# Patient Record
Sex: FEMALE | Race: BLACK OR AFRICAN AMERICAN | NOT HISPANIC OR LATINO | URBAN - METROPOLITAN AREA
[De-identification: names, ages, dates, MRNs, and addresses within clinical notes are randomized per-mention and may not be internally consistent; named-entity substitution may affect disease eponyms.]

---

## 2022-03-24 PROBLEM — D57.3 SICKLE CELL TRAIT: Status: ACTIVE | Noted: 2022-03-24

## 2022-03-24 PROBLEM — R01.1 HEART MURMUR: Status: ACTIVE | Noted: 2022-03-24

## 2022-03-24 PROBLEM — A60.00 GENITAL HERPES: Status: ACTIVE | Noted: 2022-03-24

## 2022-03-24 PROBLEM — M25.50 MULTIPLE JOINT PAIN: Status: ACTIVE | Noted: 2022-03-24

## 2023-05-10 PROBLEM — E66.01 SEVERE OBESITY (BMI 35.0-39.9) WITH COMORBIDITY: Status: ACTIVE | Noted: 2023-05-10

## 2023-05-10 PROBLEM — R63.5 WEIGHT GAIN: Status: ACTIVE | Noted: 2023-05-10

## 2023-07-19 ENCOUNTER — PATIENT MESSAGE (OUTPATIENT)
Dept: RESEARCH | Facility: HOSPITAL | Age: 38
End: 2023-07-19

## 2023-07-31 ENCOUNTER — PATIENT MESSAGE (OUTPATIENT)
Dept: RESEARCH | Facility: HOSPITAL | Age: 38
End: 2023-07-31

## 2023-12-29 PROBLEM — E78.5 HYPERLIPIDEMIA: Status: ACTIVE | Noted: 2023-12-29

## 2023-12-29 PROBLEM — E88.810 DYSMETABOLIC SYNDROME X: Status: ACTIVE | Noted: 2023-12-29

## 2024-05-09 ENCOUNTER — OFFICE VISIT (OUTPATIENT)
Dept: SURGERY | Facility: CLINIC | Age: 39
End: 2024-05-09
Payer: COMMERCIAL

## 2024-05-09 VITALS
TEMPERATURE: 98 F | WEIGHT: 199.31 LBS | HEART RATE: 75 BPM | BODY MASS INDEX: 30.3 KG/M2 | SYSTOLIC BLOOD PRESSURE: 135 MMHG | DIASTOLIC BLOOD PRESSURE: 93 MMHG

## 2024-05-09 DIAGNOSIS — R92.8 ABNORMAL MAMMOGRAM OF LEFT BREAST: Primary | ICD-10-CM

## 2024-05-09 PROCEDURE — 99203 OFFICE O/P NEW LOW 30 MIN: CPT | Mod: S$GLB,,, | Performed by: SURGERY

## 2024-05-09 PROCEDURE — 3080F DIAST BP >= 90 MM HG: CPT | Mod: CPTII,S$GLB,, | Performed by: SURGERY

## 2024-05-09 PROCEDURE — 99999 PR PBB SHADOW E&M-EST. PATIENT-LVL III: CPT | Mod: PBBFAC,,, | Performed by: SURGERY

## 2024-05-09 PROCEDURE — 1159F MED LIST DOCD IN RCRD: CPT | Mod: CPTII,S$GLB,, | Performed by: SURGERY

## 2024-05-09 PROCEDURE — 3044F HG A1C LEVEL LT 7.0%: CPT | Mod: CPTII,S$GLB,, | Performed by: SURGERY

## 2024-05-09 PROCEDURE — 3008F BODY MASS INDEX DOCD: CPT | Mod: CPTII,S$GLB,, | Performed by: SURGERY

## 2024-05-09 PROCEDURE — 3075F SYST BP GE 130 - 139MM HG: CPT | Mod: CPTII,S$GLB,, | Performed by: SURGERY

## 2024-05-09 NOTE — PROGRESS NOTES
Patient ID: Carley Carrington is a 38 y.o. female.    Chief Complaint: No chief complaint on file.      HPI:  38-year-old female who presents for evaluation of breast pain.  The breast pain has been present for 5 years.  It was mainly on the left side.  She states that 5 years ago she had a breast mammogram and ultrasound done at [facility] that showed an abnormality that biopsy was recommended for.  She then became pregnant with both her 3rd in her 4th called and never underwent any biopsy or follow-up.      She presents now to discuss this in the fact that she is ongoing breast pain.      The patient denies any changes of her breasts    Review of Systems   Constitutional:  Negative for appetite change, chills, fatigue, fever and unexpected weight change.   HENT:  Negative for hearing loss and rhinorrhea.    Eyes:  Negative for visual disturbance.   Respiratory:  Negative for apnea, cough, shortness of breath and wheezing.    Cardiovascular:  Negative for chest pain and palpitations.   Gastrointestinal:  Negative for abdominal distention, abdominal pain, blood in stool, constipation, diarrhea, nausea and vomiting.   Genitourinary:  Negative for dysuria, frequency and urgency.   Musculoskeletal:  Negative for arthralgias and neck pain.   Skin:  Negative for rash.        Breast pain   Neurological:  Negative for seizures, weakness, numbness and headaches.   Hematological:  Negative for adenopathy. Does not bruise/bleed easily.   Psychiatric/Behavioral:  Negative for hallucinations. The patient is not nervous/anxious.      Current Outpatient Medications   Medication Sig Dispense Refill    metFORMIN (GLUCOPHAGE-XR) 500 MG ER 24hr tablet Take 2 tablets (1,000 mg total) by mouth once daily. 180 tablet 1    semaglutide (OZEMPIC) 2 mg/dose (8 mg/3 mL) PnIj Inject 2 mg into the skin every 7 days. Dx e88.81 3 mL 5     No current facility-administered medications for this visit.       Review of patient's allergies  indicates:  No Known Allergies    Past Medical History:   Diagnosis Date    Benign tumor of heart        No past surgical history on file.    Social History     Socioeconomic History    Marital status:     Number of children: 4   Tobacco Use    Smoking status: Never    Smokeless tobacco: Never   Substance and Sexual Activity    Alcohol use: Never    Drug use: Not Currently    Sexual activity: Yes       Vitals:    05/09/24 1143   BP: (!) 135/93   Pulse: 75   Temp: 97.9 °F (36.6 °C)     Cycles began at age 14   Four pregnancies  Four children    all   Continuing to have regular cycles.      No family history of breast cancer    Physical Exam  Constitutional:       Appearance: She is well-developed.   HENT:      Head: Normocephalic.   Eyes:      Pupils: Pupils are equal, round, and reactive to light.   Neck:      Thyroid: No thyromegaly.      Vascular: No JVD.      Trachea: No tracheal deviation.   Cardiovascular:      Rate and Rhythm: Normal rate and regular rhythm.      Heart sounds: Normal heart sounds.   Pulmonary:      Breath sounds: Normal breath sounds. No wheezing.   Abdominal:      General: Bowel sounds are normal. There is no distension.      Palpations: Abdomen is soft. Abdomen is not rigid. There is no mass.      Tenderness: There is no abdominal tenderness. There is no guarding or rebound.   Musculoskeletal:         General: Normal range of motion.   Lymphadenopathy:      Cervical: No cervical adenopathy.   Skin:     General: Skin is warm and dry.      Findings: No erythema or rash.      Comments: Numerous tattoos.      Bilateral breast exam was performed.  The breasts are symmetrical.  There are no skin changes, masses, nipple discharges nor axillary adenopathy noted bilaterally.   Neurological:      Mental Status: She is oriented to person, place, and time.     Assessment & Plan:    Breast pain  Recommended nonsteroidal anti-inflammatories.  She could try Primrose oil or vitamin-E which  may help although there has no established evidence for this.      History of an abnormality in the left breast for which biopsy was recommended.      Bilateral diagnostic mammograms and left-sided ultrasound.      Appropriate recommendations and follow up pending mammogram and ultrasound    To obtain her imaging from the [facility] and bring it to her mammogram needed for comparison

## 2024-05-09 NOTE — PATIENT INSTRUCTIONS
Need to have a mammogram and an ultrasound of your breast to re-evaluate the left breast for any abnormalities.      If there are any abnormalities a biopsy would be recommended by the radiology department.      I would recommend that you obtain your images from the [facility] and bring him with you to your mammogram appointment as they will need these for comparison      Breast pain is best treated with nonsteroidal anti-inflammatories.  There is some anti abdominal evidence that you can try vitamin-E and evening Primrose oil which may or may not help    Our office phone numbers are  [phone] and

## 2024-07-26 ENCOUNTER — OFFICE VISIT (OUTPATIENT)
Dept: OPHTHALMOLOGY | Facility: CLINIC | Age: 39
End: 2024-07-26
Payer: COMMERCIAL

## 2024-07-26 DIAGNOSIS — H40.013 OPEN ANGLE WITH BORDERLINE FINDINGS AND LOW GLAUCOMA RISK IN BOTH EYES: Primary | ICD-10-CM

## 2024-07-26 PROCEDURE — 99999 PR PBB SHADOW E&M-EST. PATIENT-LVL II: CPT | Mod: PBBFAC,,, | Performed by: OPHTHALMOLOGY

## 2024-09-25 ENCOUNTER — OFFICE VISIT (OUTPATIENT)
Dept: OBSTETRICS AND GYNECOLOGY | Facility: CLINIC | Age: 39
End: 2024-09-25
Payer: COMMERCIAL

## 2024-09-25 ENCOUNTER — LAB VISIT (OUTPATIENT)
Dept: LAB | Facility: HOSPITAL | Age: 39
End: 2024-09-25
Attending: OBSTETRICS & GYNECOLOGY
Payer: COMMERCIAL

## 2024-09-25 VITALS
HEIGHT: 70 IN | SYSTOLIC BLOOD PRESSURE: 124 MMHG | WEIGHT: 171.75 LBS | BODY MASS INDEX: 24.59 KG/M2 | DIASTOLIC BLOOD PRESSURE: 86 MMHG

## 2024-09-25 DIAGNOSIS — Z11.3 SCREENING EXAMINATION FOR STD (SEXUALLY TRANSMITTED DISEASE): ICD-10-CM

## 2024-09-25 DIAGNOSIS — Z01.419 ENCOUNTER FOR GYNECOLOGICAL EXAMINATION (GENERAL) (ROUTINE) WITHOUT ABNORMAL FINDINGS: Primary | ICD-10-CM

## 2024-09-25 DIAGNOSIS — Z12.4 SCREENING FOR CERVICAL CANCER: ICD-10-CM

## 2024-09-25 DIAGNOSIS — Z72.51 HIGH RISK HETEROSEXUAL BEHAVIOR: ICD-10-CM

## 2024-09-25 DIAGNOSIS — N89.8 VAGINAL DISCHARGE: ICD-10-CM

## 2024-09-25 DIAGNOSIS — N92.1 METRORRHAGIA: ICD-10-CM

## 2024-09-25 DIAGNOSIS — Z30.431 ENCOUNTER FOR ROUTINE CHECKING OF INTRAUTERINE CONTRACEPTIVE DEVICE (IUD): ICD-10-CM

## 2024-09-25 LAB
HIV 1+2 AB+HIV1 P24 AG SERPL QL IA: NORMAL
TREPONEMA PALLIDUM IGG+IGM AB [PRESENCE] IN SERUM OR PLASMA BY IMMUNOASSAY: NONREACTIVE

## 2024-09-25 PROCEDURE — 99999 PR PBB SHADOW E&M-EST. PATIENT-LVL III: CPT | Mod: PBBFAC,,, | Performed by: OBSTETRICS & GYNECOLOGY

## 2024-09-25 PROCEDURE — 80074 ACUTE HEPATITIS PANEL: CPT | Performed by: OBSTETRICS & GYNECOLOGY

## 2024-09-25 PROCEDURE — 87491 CHLMYD TRACH DNA AMP PROBE: CPT | Performed by: OBSTETRICS & GYNECOLOGY

## 2024-09-25 PROCEDURE — 87591 N.GONORRHOEAE DNA AMP PROB: CPT | Performed by: OBSTETRICS & GYNECOLOGY

## 2024-09-25 PROCEDURE — 86593 SYPHILIS TEST NON-TREP QUANT: CPT | Performed by: OBSTETRICS & GYNECOLOGY

## 2024-09-25 PROCEDURE — 36415 COLL VENOUS BLD VENIPUNCTURE: CPT | Performed by: OBSTETRICS & GYNECOLOGY

## 2024-09-25 PROCEDURE — 87389 HIV-1 AG W/HIV-1&-2 AB AG IA: CPT | Performed by: OBSTETRICS & GYNECOLOGY

## 2024-09-25 NOTE — PROGRESS NOTES
Subjective:       Patient ID: Carley Carrington is a 38 y.o. female.    Chief Complaint:  Well Woman      History of Present Illness  HPI  Annual Exam-Premenopausal  Patient presents for annual exam. The patient has no complaints today. The patient is sexually active. --iud present, 1 partner; --wants std check; iud check; GYN screening history: last pap: was normal and patient does not recall when last pap was. The patient wears seatbelts: yes. The patient participates in regular exercise: no. Has the patient ever been transfused or tattooed?: yes. _+tattooes; The patient reports that there is not domestic violence in her life.   Menses irreg with iud; flow can be 4-14 d; super pad; change q 3 hrs; no double up; tolerable dysmenorrhea  Problems staying alseep  Denies urinary leakage      GYN & OB History  Patient's last menstrual period was 2024 (approximate).   Date of Last Pap: 2024    OB History    Para Term  AB Living   5 4 4   1 4   SAB IAB Ectopic Multiple Live Births   1       4      # Outcome Date GA Lbr Sanket/2nd Weight Sex Type Anes PTL Lv   5 SAB               Complications: Miscarriage   4 Term      Vag-Spont   NAVEEN   3 Term      Vag-Spont   NAVEEN   2 Term      Vag-Spont   NAVEEN   1 Term      Vag-Spont   NAVEEN       Review of Systems  Review of Systems   Genitourinary:  Positive for menorrhagia and menstrual problem.   All other systems reviewed and are negative.          Objective:      Physical Exam:   Constitutional: She is oriented to person, place, and time. She appears well-developed and well-nourished.     Eyes: Pupils are equal, round, and reactive to light. Conjunctivae and EOM are normal.      Pulmonary/Chest: Effort normal. Right breast exhibits no mass, no nipple discharge, no skin change and no tenderness. Left breast exhibits no mass, no nipple discharge, no skin change and no tenderness. Breasts are symmetrical.        Abdominal: Soft.     Genitourinary:    Inguinal canal,  urethra, bladder, vagina, right adnexa, left adnexa and rectum normal.      Pelvic exam was performed with patient supine.   The external female genitalia was normal.     Labial bartholins normal.Cervix is normal. Right adnexum displays no mass and no tenderness. Left adnexum displays no mass and no tenderness. No erythema, vaginal discharge, bleeding, rectocele, cystocele or prolapse of vaginal walls in the vagina. Vagina was moist.Cervix exhibits no motion tenderness and no friability.    pap smear completedUerus contour normal  Uterus is enlarged. Uterus size: 8 cm.Normal urethral meatus.Urethra findings: no urethral massBladder findings: no bladder distention and no bladder tendernessIUD strings visualized.          Musculoskeletal: Normal range of motion and moves all extremeties.       Neurological: She is alert and oriented to person, place, and time.    Skin: Skin is warm.    Psychiatric: She has a normal mood and affect. Her behavior is normal.           Assessment:        1. Encounter for gynecological examination (general) (routine) without abnormal findings    2. Screening for cervical cancer    3. Encounter for routine checking of intrauterine contraceptive device (IUD)    4. Vaginal discharge    5. High risk heterosexual behavior    6. Screening examination for STD (sexually transmitted disease)    7. Metrorrhagia               Plan:      Continue annual well woman exam.  Pap today; Reviewed updated recommendations for pap smears (every 3 years) in low risk patients.   Recommend annual pelvic exams.  Reviewed recommendations for annual CBE.  Aware mammo due age 42  Gc/ct hiv/rpr/hep c per pt request  Pelvic sono to [facility] for etiol of menorrhagia  Continue diet, exercise, weight loss

## 2024-09-26 LAB
HAV IGM SERPL QL IA: NORMAL
HBV CORE IGM SERPL QL IA: NORMAL
HBV SURFACE AG SERPL QL IA: NORMAL
HCV AB SERPL QL IA: NORMAL

## 2024-09-27 LAB
C TRACH DNA SPEC QL NAA+PROBE: NOT DETECTED
N GONORRHOEA DNA SPEC QL NAA+PROBE: NOT DETECTED

## 2024-09-30 ENCOUNTER — OFFICE VISIT (OUTPATIENT)
Dept: OTOLARYNGOLOGY | Facility: CLINIC | Age: 39
End: 2024-09-30
Payer: COMMERCIAL

## 2024-09-30 VITALS — HEIGHT: 71 IN | BODY MASS INDEX: 24.04 KG/M2 | WEIGHT: 171.75 LBS

## 2024-09-30 DIAGNOSIS — J35.1 ENLARGED TONSILS: ICD-10-CM

## 2024-09-30 DIAGNOSIS — J35.8 TONSILLOLITH: Primary | ICD-10-CM

## 2024-09-30 PROCEDURE — 99999 PR PBB SHADOW E&M-EST. PATIENT-LVL III: CPT | Mod: PBBFAC,,, | Performed by: PHYSICIAN ASSISTANT

## 2024-09-30 PROCEDURE — 1159F MED LIST DOCD IN RCRD: CPT | Mod: CPTII,S$GLB,, | Performed by: PHYSICIAN ASSISTANT

## 2024-09-30 PROCEDURE — 3044F HG A1C LEVEL LT 7.0%: CPT | Mod: CPTII,S$GLB,, | Performed by: PHYSICIAN ASSISTANT

## 2024-09-30 PROCEDURE — 99204 OFFICE O/P NEW MOD 45 MIN: CPT | Mod: S$GLB,,, | Performed by: PHYSICIAN ASSISTANT

## 2024-09-30 PROCEDURE — 3008F BODY MASS INDEX DOCD: CPT | Mod: CPTII,S$GLB,, | Performed by: PHYSICIAN ASSISTANT

## 2024-09-30 NOTE — H&P (VIEW-ONLY)
"Subjective:   Patient ID: Carley Carrington is a 38 y.o. female.    Chief Complaint: Tonsils (Pt is coming in today for enlarged tonsils)    Ms. Carrington is a very pleasant 39 yo female with h/o recurring tonsil stones. She is coughing up tonsil stones daily and they are irritating her throat and "making it bleed." They are painful and causing bad breath. She states that she has had multiple and recurrent episodes of inflamed and painful tonsils and wants to have tonsils removed.       Review of patient's allergies indicates:  No Known Allergies        Review of Systems   Constitutional: Negative.    Eyes: Negative.    Respiratory: Negative.     Cardiovascular: Negative.    Gastrointestinal: Negative.    Endocrine: Negative.    Genitourinary: Negative.    Musculoskeletal: Negative.    Skin: Negative.    Allergic/Immunologic: Negative.    Neurological:  Positive for light-headedness.   Hematological:  Bruises/bleeds easily.   Psychiatric/Behavioral: Negative.           Objective:   Ht 5' 10.8" (1.798 m)   Wt 77.9 kg (171 lb 11.8 oz)   LMP 09/09/2024 (Approximate)   BMI 24.09 kg/m²     Physical Exam  Constitutional:       General: She is not in acute distress.     Appearance: She is well-developed.   HENT:      Head: Normocephalic and atraumatic.      Right Ear: Tympanic membrane, ear canal and external ear normal.      Left Ear: Tympanic membrane, ear canal and external ear normal.      Nose: Nose normal. No nasal deformity, septal deviation, mucosal edema or rhinorrhea.      Right Sinus: No maxillary sinus tenderness or frontal sinus tenderness.      Left Sinus: No maxillary sinus tenderness or frontal sinus tenderness.      Mouth/Throat:      Mouth: Mucous membranes are not pale and not dry.      Dentition: No dental caries.      Pharynx: Uvula midline. No oropharyngeal exudate or posterior oropharyngeal erythema.      Tonsils: 2+ on the right. 2+ on the left.   Eyes:      General: Lids are normal. No scleral icterus.    "  Extraocular Movements:      Right eye: Normal extraocular motion and no nystagmus.      Left eye: Normal extraocular motion and no nystagmus.      Conjunctiva/sclera: Conjunctivae normal.      Right eye: Right conjunctiva is not injected. No chemosis.     Left eye: Left conjunctiva is not injected. No chemosis.     Pupils: Pupils are equal, round, and reactive to light.   Neck:      Thyroid: No thyroid mass or thyromegaly.      Trachea: Trachea and phonation normal. No tracheal tenderness or tracheal deviation.   Pulmonary:      Effort: Pulmonary effort is normal. No respiratory distress.      Breath sounds: No stridor.   Abdominal:      General: There is no distension.   Lymphadenopathy:      Head:      Right side of head: No submental, submandibular, preauricular, posterior auricular or occipital adenopathy.      Left side of head: No submental, submandibular, preauricular, posterior auricular or occipital adenopathy.      Cervical: No cervical adenopathy.   Skin:     General: Skin is warm and dry.      Findings: No erythema or rash.   Neurological:      Mental Status: She is alert and oriented to person, place, and time.      Cranial Nerves: No cranial nerve deficit.   Psychiatric:         Behavior: Behavior normal.              Assessment:     1. Tonsillolith    2. Enlarged tonsils        Plan:     Tonsillolith    Enlarged tonsils      Discussed tonsillectomy, risks and benefits, including a 1% risk of postoperative bleeding.  Patient voices understanding and agrees to proceed. We will schedule surgery in the near future. The patient will follow up with me 2-3 weeks after surgery.

## 2024-10-01 ENCOUNTER — TELEPHONE (OUTPATIENT)
Dept: OTOLARYNGOLOGY | Facility: CLINIC | Age: 39
End: 2024-10-01
Payer: COMMERCIAL

## 2024-10-01 ENCOUNTER — PATIENT MESSAGE (OUTPATIENT)
Dept: OBSTETRICS AND GYNECOLOGY | Facility: CLINIC | Age: 39
End: 2024-10-01
Payer: COMMERCIAL

## 2024-10-01 ENCOUNTER — TELEPHONE (OUTPATIENT)
Dept: OBSTETRICS AND GYNECOLOGY | Facility: CLINIC | Age: 39
End: 2024-10-01
Payer: COMMERCIAL

## 2024-10-01 DIAGNOSIS — J35.1 TONSILLAR HYPERTROPHY: Primary | ICD-10-CM

## 2024-10-01 DIAGNOSIS — Z30.2 ENCOUNTER FOR STERILIZATION: Primary | ICD-10-CM

## 2024-10-01 NOTE — TELEPHONE ENCOUNTER
----- Message from Jannet Chavez MD sent at 10/1/2024 10:18 AM CDT -----  Wants laparoscopic tubal ligation (falope ring)  Iud removal    Needs preop appt to sign consents  (Commercial insurance no secondary)

## 2024-10-03 ENCOUNTER — HOSPITAL ENCOUNTER (OUTPATIENT)
Dept: RADIOLOGY | Facility: HOSPITAL | Age: 39
Discharge: HOME | End: 2024-10-03
Attending: OBSTETRICS & GYNECOLOGY
Payer: COMMERCIAL

## 2024-10-03 DIAGNOSIS — N92.1 METRORRHAGIA: ICD-10-CM

## 2024-10-03 DIAGNOSIS — Z30.431 ENCOUNTER FOR ROUTINE CHECKING OF INTRAUTERINE CONTRACEPTIVE DEVICE (IUD): ICD-10-CM

## 2024-10-03 PROCEDURE — 76856 US EXAM PELVIC COMPLETE: CPT | Mod: 26,,, | Performed by: RADIOLOGY

## 2024-10-03 PROCEDURE — 76830 TRANSVAGINAL US NON-OB: CPT | Mod: TC

## 2024-10-03 PROCEDURE — 76830 TRANSVAGINAL US NON-OB: CPT | Mod: 26,,, | Performed by: RADIOLOGY

## 2024-10-03 PROCEDURE — 76856 US EXAM PELVIC COMPLETE: CPT | Mod: TC

## 2024-10-03 NOTE — PROGRESS NOTES
The pelvic sono results are available for review.  The iud is appropriately positioned.  The right ovary has a cyst; 7 cm; the left ovary is normal;   If the cyst is still present at the time of your tubal procedure I will remove it during the surgery

## 2024-10-14 ENCOUNTER — PATIENT MESSAGE (OUTPATIENT)
Dept: PREADMISSION TESTING | Facility: HOSPITAL | Age: 39
End: 2024-10-14
Payer: COMMERCIAL

## 2024-10-14 ENCOUNTER — TELEPHONE (OUTPATIENT)
Dept: PREADMISSION TESTING | Facility: HOSPITAL | Age: 39
End: 2024-10-14
Payer: COMMERCIAL

## 2024-10-14 NOTE — TELEPHONE ENCOUNTER
Spoke with Dr. Garcia with Anesthesia regarding pt health history, specifically the patient's ECHO which showed mild RVE . Patient last took Ozempic on 10/13.    Anesthesia response: okay to proceed.     Patient scheduled for surgery on 10/18/24.

## 2024-10-15 ENCOUNTER — TELEPHONE (OUTPATIENT)
Dept: PREADMISSION TESTING | Facility: HOSPITAL | Age: 39
End: 2024-10-15
Payer: COMMERCIAL

## 2024-10-15 NOTE — TELEPHONE ENCOUNTER
Called and left message with Herminia, Medical Records ([phone]) for cardiac clearance. Gave call back number and fax number

## 2024-10-16 ENCOUNTER — TELEPHONE (OUTPATIENT)
Dept: PREADMISSION TESTING | Facility: HOSPITAL | Age: 39
End: 2024-10-16
Payer: COMMERCIAL

## 2024-10-16 NOTE — TELEPHONE ENCOUNTER
Called and left message with Herminia, Medical records for Dr. Ignacio's office. Received cardiac records. Did not receive clearance. Gave call back number and fax number.

## 2024-10-17 ENCOUNTER — PATIENT MESSAGE (OUTPATIENT)
Dept: PREADMISSION TESTING | Facility: HOSPITAL | Age: 39
End: 2024-10-17
Payer: COMMERCIAL

## 2024-10-17 ENCOUNTER — ANESTHESIA EVENT (OUTPATIENT)
Dept: SURGERY | Facility: HOSPITAL | Age: 39
End: 2024-10-17
Payer: COMMERCIAL

## 2024-10-17 NOTE — ANESTHESIA PREPROCEDURE EVALUATION
10/17/2024  Carley Carrington is a 38 y.o., female.      Pre-op Assessment    I have reviewed the Patient Summary Reports.    I have reviewed the NPO Status.   I have reviewed the Medications.     Review of Systems  Anesthesia Hx:   Neg history of prior surgery.          Denies Family Hx of Anesthesia complications.    Denies Personal Hx of Anesthesia complications.                    EENT/Dental:            Chronic Tonsillitis    Cardiovascular:                hyperlipidemia                               Pulmonary:  Pulmonary Normal                       Hepatic/GI:  Hepatic/GI Normal                    Musculoskeletal:  Arthritis               Endocrine:  Endocrine Normal                Physical Exam  General: Well nourished    Airway:  Mallampati: I   Mouth Opening: Normal  TM Distance: Normal  Tongue: Normal  Neck ROM: Normal ROM    Dental:  Intact        Anesthesia Plan  Type of Anesthesia, risks & benefits discussed:    Anesthesia Type: Gen ETT  Intra-op Monitoring Plan: Standard ASA Monitors  Post Op Pain Control Plan: multimodal analgesia and IV/PO Opioids PRN  Induction:  IV  Informed Consent: Informed consent signed with the Patient and all parties understand the risks and agree with anesthesia plan.  All questions answered. Patient consented to blood products? No  ASA Score: 2  Day of Surgery Review of History & Physical: H&P Update referred to the surgeon/provider.    Ready For Surgery From Anesthesia Perspective.     .

## 2024-10-18 ENCOUNTER — HOSPITAL ENCOUNTER (OUTPATIENT)
Facility: HOSPITAL | Age: 39
Discharge: HOME | End: 2024-10-18
Attending: OTOLARYNGOLOGY | Admitting: OTOLARYNGOLOGY
Payer: COMMERCIAL

## 2024-10-18 ENCOUNTER — ANESTHESIA (OUTPATIENT)
Dept: SURGERY | Facility: HOSPITAL | Age: 39
End: 2024-10-18
Payer: COMMERCIAL

## 2024-10-18 DIAGNOSIS — J35.01 CHRONIC TONSILLITIS: Primary | ICD-10-CM

## 2024-10-18 LAB
B-HCG UR QL: NEGATIVE
CTP QC/QA: YES

## 2024-10-18 PROCEDURE — 88304 TISSUE EXAM BY PATHOLOGIST: CPT | Mod: 59 | Performed by: PATHOLOGY

## 2024-10-18 PROCEDURE — 63600175 PHARM REV CODE 636 W HCPCS: Performed by: NURSE ANESTHETIST, CERTIFIED REGISTERED

## 2024-10-18 PROCEDURE — 71000033 HC RECOVERY, INTIAL HOUR: Performed by: OTOLARYNGOLOGY

## 2024-10-18 PROCEDURE — 37000008 HC ANESTHESIA 1ST 15 MINUTES: Performed by: OTOLARYNGOLOGY

## 2024-10-18 PROCEDURE — 88304 TISSUE EXAM BY PATHOLOGIST: CPT | Mod: 26,,, | Performed by: PATHOLOGY

## 2024-10-18 PROCEDURE — 42826 REMOVAL OF TONSILS: CPT | Mod: ,,, | Performed by: OTOLARYNGOLOGY

## 2024-10-18 PROCEDURE — 36000706: Performed by: OTOLARYNGOLOGY

## 2024-10-18 PROCEDURE — 37000009 HC ANESTHESIA EA ADD 15 MINS: Performed by: OTOLARYNGOLOGY

## 2024-10-18 PROCEDURE — 81025 URINE PREGNANCY TEST: CPT | Performed by: OTOLARYNGOLOGY

## 2024-10-18 PROCEDURE — 36000707: Performed by: OTOLARYNGOLOGY

## 2024-10-18 PROCEDURE — 71000015 HC POSTOP RECOV 1ST HR: Performed by: OTOLARYNGOLOGY

## 2024-10-18 PROCEDURE — 25000003 PHARM REV CODE 250: Performed by: NURSE ANESTHETIST, CERTIFIED REGISTERED

## 2024-10-18 RX ORDER — KETOROLAC TROMETHAMINE 30 MG/ML
INJECTION, SOLUTION INTRAMUSCULAR; INTRAVENOUS
Status: DISCONTINUED | OUTPATIENT
Start: 2024-10-18 | End: 2024-10-18

## 2024-10-18 RX ORDER — FENTANYL CITRATE 50 UG/ML
25 INJECTION, SOLUTION INTRAMUSCULAR; INTRAVENOUS EVERY 5 MIN PRN
Status: DISCONTINUED | OUTPATIENT
Start: 2024-10-18 | End: 2024-10-18 | Stop reason: HOSPADM

## 2024-10-18 RX ORDER — ONDANSETRON HYDROCHLORIDE 2 MG/ML
4 INJECTION, SOLUTION INTRAVENOUS DAILY PRN
Status: DISCONTINUED | OUTPATIENT
Start: 2024-10-18 | End: 2024-10-18 | Stop reason: HOSPADM

## 2024-10-18 RX ORDER — PROPOFOL 10 MG/ML
VIAL (ML) INTRAVENOUS
Status: DISCONTINUED | OUTPATIENT
Start: 2024-10-18 | End: 2024-10-18

## 2024-10-18 RX ORDER — ONDANSETRON HYDROCHLORIDE 2 MG/ML
INJECTION, SOLUTION INTRAVENOUS
Status: DISCONTINUED | OUTPATIENT
Start: 2024-10-18 | End: 2024-10-18

## 2024-10-18 RX ORDER — LIDOCAINE HYDROCHLORIDE 20 MG/ML
INJECTION, SOLUTION EPIDURAL; INFILTRATION; INTRACAUDAL; PERINEURAL
Status: DISCONTINUED | OUTPATIENT
Start: 2024-10-18 | End: 2024-10-18

## 2024-10-18 RX ORDER — ROCURONIUM BROMIDE 10 MG/ML
INJECTION, SOLUTION INTRAVENOUS
Status: DISCONTINUED | OUTPATIENT
Start: 2024-10-18 | End: 2024-10-18

## 2024-10-18 RX ORDER — HYDROCODONE BITARTRATE AND ACETAMINOPHEN 7.5; 325 MG/15ML; MG/15ML
15 SOLUTION ORAL EVERY 4 HOURS PRN
Qty: 473 ML | Refills: 0 | Status: SHIPPED | OUTPATIENT
Start: 2024-10-18

## 2024-10-18 RX ORDER — OXYCODONE AND ACETAMINOPHEN 5; 325 MG/1; MG/1
1 TABLET ORAL
Status: DISCONTINUED | OUTPATIENT
Start: 2024-10-18 | End: 2024-10-18 | Stop reason: HOSPADM

## 2024-10-18 RX ORDER — GLUCAGON 1 MG
1 KIT INJECTION
Status: DISCONTINUED | OUTPATIENT
Start: 2024-10-18 | End: 2024-10-18 | Stop reason: HOSPADM

## 2024-10-18 RX ORDER — ACETAMINOPHEN 160 MG/5ML
15 LIQUID ORAL EVERY 6 HOURS PRN
COMMUNITY
Start: 2024-10-18

## 2024-10-18 RX ORDER — SUCCINYLCHOLINE CHLORIDE 20 MG/ML
INJECTION INTRAMUSCULAR; INTRAVENOUS
Status: DISCONTINUED | OUTPATIENT
Start: 2024-10-18 | End: 2024-10-18

## 2024-10-18 RX ORDER — ACETAMINOPHEN 10 MG/ML
INJECTION, SOLUTION INTRAVENOUS
Status: DISCONTINUED | OUTPATIENT
Start: 2024-10-18 | End: 2024-10-18

## 2024-10-18 RX ORDER — FENTANYL CITRATE 50 UG/ML
INJECTION, SOLUTION INTRAMUSCULAR; INTRAVENOUS
Status: DISCONTINUED | OUTPATIENT
Start: 2024-10-18 | End: 2024-10-18

## 2024-10-18 RX ORDER — DEXAMETHASONE SODIUM PHOSPHATE 4 MG/ML
INJECTION, SOLUTION INTRA-ARTICULAR; INTRALESIONAL; INTRAMUSCULAR; INTRAVENOUS; SOFT TISSUE
Status: DISCONTINUED | OUTPATIENT
Start: 2024-10-18 | End: 2024-10-18

## 2024-10-18 RX ORDER — MEPERIDINE HYDROCHLORIDE 25 MG/ML
12.5 INJECTION INTRAMUSCULAR; INTRAVENOUS; SUBCUTANEOUS ONCE AS NEEDED
Status: DISCONTINUED | OUTPATIENT
Start: 2024-10-18 | End: 2024-10-18 | Stop reason: HOSPADM

## 2024-10-18 RX ORDER — MIDAZOLAM HYDROCHLORIDE 1 MG/ML
INJECTION, SOLUTION INTRAMUSCULAR; INTRAVENOUS
Status: DISCONTINUED | OUTPATIENT
Start: 2024-10-18 | End: 2024-10-18

## 2024-10-18 RX ORDER — HYDROMORPHONE HYDROCHLORIDE 2 MG/ML
INJECTION, SOLUTION INTRAMUSCULAR; INTRAVENOUS; SUBCUTANEOUS
Status: DISCONTINUED | OUTPATIENT
Start: 2024-10-18 | End: 2024-10-18

## 2024-10-18 RX ADMIN — LIDOCAINE HYDROCHLORIDE 70 MG: 20 INJECTION, SOLUTION EPIDURAL; INFILTRATION; INTRACAUDAL; PERINEURAL at 07:10

## 2024-10-18 RX ADMIN — HYDROMORPHONE HYDROCHLORIDE 0.4 MG: 2 INJECTION INTRAMUSCULAR; INTRAVENOUS; SUBCUTANEOUS at 07:10

## 2024-10-18 RX ADMIN — ROCURONIUM BROMIDE 5 MG: 10 SOLUTION INTRAVENOUS at 07:10

## 2024-10-18 RX ADMIN — FENTANYL CITRATE 100 MCG: 50 INJECTION, SOLUTION INTRAMUSCULAR; INTRAVENOUS at 07:10

## 2024-10-18 RX ADMIN — PROPOFOL 200 MG: 10 INJECTION, EMULSION INTRAVENOUS at 07:10

## 2024-10-18 RX ADMIN — SUCCINYLCHOLINE CHLORIDE 120 MG: 20 INJECTION, SOLUTION INTRAMUSCULAR; INTRAVENOUS; PARENTERAL at 07:10

## 2024-10-18 RX ADMIN — ONDANSETRON 4 MG: 2 INJECTION INTRAMUSCULAR; INTRAVENOUS at 08:10

## 2024-10-18 RX ADMIN — KETOROLAC TROMETHAMINE 30 MG: 30 INJECTION, SOLUTION INTRAMUSCULAR; INTRAVENOUS at 08:10

## 2024-10-18 RX ADMIN — ACETAMINOPHEN 1000 MG: 10 INJECTION, SOLUTION INTRAVENOUS at 07:10

## 2024-10-18 RX ADMIN — DEXAMETHASONE SODIUM PHOSPHATE 12 MG: 4 INJECTION, SOLUTION INTRA-ARTICULAR; INTRALESIONAL; INTRAMUSCULAR; INTRAVENOUS; SOFT TISSUE at 07:10

## 2024-10-18 RX ADMIN — SODIUM CHLORIDE, POTASSIUM CHLORIDE, SODIUM LACTATE AND CALCIUM CHLORIDE: 600; 310; 30; 20 INJECTION, SOLUTION INTRAVENOUS at 07:10

## 2024-10-18 RX ADMIN — MIDAZOLAM 2 MG: 1 INJECTION INTRAMUSCULAR; INTRAVENOUS at 07:10

## 2024-10-18 NOTE — OP NOTE
TONSILLECTOMY  Procedure Note    Carley Carrington  10/18/2024    Surgeon:  Dr. Juancarlos Bee  Assistant:  None    Preoperative diagnosis:  chronic pharyngotonsillitis    Postoperative diagnosis:  Same    Procedure:  TONSILLECTOMY    Findings:     2+ tonsils bilaterally     Anesthesia:  General endotracheal anesthesia    Blood loss:  5 ml    Specimen:  Tonsils    Medications administered in the OR:  Decadron 12 mg IV    Implants:  None    Indications for procedure:  Patient presented to clinic with complaints of purulent debris in throat.  Risks and benefits of the procedure were extensively discussed with the patient, and they elected to proceed with the procedure.    Procedure in Detail:  After appropriate consents were obtained, the patient was taken to the operating room and placed in a supine position.  Anesthesia then obtained intravenous access and placed the patient under general endotracheal anesthesia.  The head of the bed was rotated 90 degrees, and a small shoulder roll was placed.  A John-Shashi mouth retractor was then placed in the patient's oral cavity and suspended from a kent stand.  The soft palate was examined, and it was found to be of adequate length and the uvula had a normal contour.  A red rubber catheter was passed through a nostril and held in place with a gauze and hemostat to elevate the soft palate.    The right tonsil was grasped using a straight allis, and the bovie electrocautery on a setting of 20 was used to dissect the right tonsil in a superior to inferior direction, in a subcapsular plane.  The tonsil was then severed from it's inferior attachment and passed off the field.  Adequate hemostasis was achieved using the bovie suction electrocautery.  The left tonsil was then removed in a similar fashion.    The patient's oral cavity was then irrigated with normal saline, and a flexible suction catheter was passed to the patient's stomach to evacuate gastric contents.  The mouth retractor  was then removed, and the patient's teeth, gums, and lips were all examined and were found to be free of any trauma.  The patient's care was then returned to anesthesia, and the patient was awakened and extubated without difficulty, and brought to the recovery room in good condition.

## 2024-10-18 NOTE — ANESTHESIA POSTPROCEDURE EVALUATION
Anesthesia Post Evaluation    Patient: Carley Carrington    Procedure(s) Performed: Procedure(s) (LRB):  TONSILLECTOMY (N/A)    Final Anesthesia Type: general      Patient location during evaluation: PACU  Patient participation: Yes- Able to Participate  Level of consciousness: awake  Post-procedure vital signs: reviewed and stable  Pain management: adequate  Airway patency: patent    PONV status at discharge: No PONV  Anesthetic complications: no      Cardiovascular status: stable  Respiratory status: unassisted  Hydration status: euvolemic  Follow-up not needed.              Vitals Value Taken Time   /68 10/18/24 0842   Temp 36.8 °C (98.2 °F) 10/18/24 0822   Pulse 88 10/18/24 0842   Resp 14 10/18/24 0842   SpO2 100 % 10/18/24 0842   Vitals shown include unfiled device data.      No case tracking events are documented in the log.      Pain/Quincy Score: Quincy Score: 8 (10/18/2024  8:35 AM)

## 2024-10-18 NOTE — TRANSFER OF CARE
"Anesthesia Transfer of Care Note    Patient: Carley Carrington    Procedure(s) Performed: Procedure(s) (LRB):  TONSILLECTOMY (N/A)    Patient location: PACU    Anesthesia Type: general    Transport from OR: Transported from OR on room air with adequate spontaneous ventilation    Post pain: adequate analgesia    Post assessment: no apparent anesthetic complications and tolerated procedure well    Post vital signs: stable    Level of consciousness: awake    Nausea/Vomiting: no nausea/vomiting    Complications: none    Transfer of care protocol was followed      Last vitals: Visit Vitals  /62 (BP Location: Right arm, Patient Position: Lying)   Pulse 87   Temp 36.8 °C (98.2 °F) (Temporal)   Resp 19   Ht 5' 10" (1.778 m)   Wt 76.1 kg (167 lb 12.3 oz)   LMP 09/29/2024 (Approximate)   SpO2 100%   Breastfeeding No   BMI 24.07 kg/m²     "

## 2024-10-18 NOTE — ANESTHESIA PROCEDURE NOTES
Intubation    Date/Time: 10/18/2024 7:51 AM    Performed by: Fernando Wallace CRNA  Authorized by: Cassidy Hernandez MD    Intubation:     Induction:  Intravenous    Intubated:  Postinduction    Mask Ventilation:  Not attempted    Attempts:  1    Attempted By:  CRNA    Method of Intubation:  Video laryngoscopy    Blade:  Louise 3    Laryngeal View Grade: Grade I - full view of cords      Difficult Airway Encountered?: No      Complications:  None    Airway Device:  Oral endotracheal tube    Airway Device Size:  7.0    Style/Cuff Inflation:  Cuffed (inflated to minimal occlusive pressure)    Inflation Amount (mL):  7    Tube secured:  22    Secured at:  The lips    Placement Verified By:  Capnometry    Complicating Factors:  None    Findings Post-Intubation:  BS equal bilateral and atraumatic/condition of teeth unchanged

## 2024-10-18 NOTE — BRIEF OP NOTE
[facility]  Brief Operative Note     SUMMARY     Surgery Date: 10/18/2024     Surgeons and Role:     * Juancarlos Bee MD - Primary    Assisting Surgeon: None    Pre-op Diagnosis:  Tonsillar hypertrophy [J35.1]    Post-op Diagnosis:  Post-Op Diagnosis Codes:     * Tonsillar hypertrophy [J35.1]    Procedure(s) (LRB):  TONSILLECTOMY (N/A)    Anesthesia: General    Findings/Key Components:  cryptic tonsils with tonsilloliths    Estimated Blood Loss: 5 ml         Specimens:   Specimen (24h ago, onward)       Start     Ordered    10/18/24 0805  Specimen to Pathology, Surgery ENT  Once        Comments: Pre-op Diagnosis: Tonsillar hypertrophy [J35.1]Procedure(s):TONSILLECTOMY Number of specimens: 2Name of specimens: 1. Right tonsil - PERM2. Left tonsil - PERM     References:    Click here for ordering Quick Tip   Question Answer Comment   Procedure Type: ENT    Specimen Class: Routine/Screening    Release to patient Immediate        10/18/24 0804                    Discharge Note    SUMMARY     Admit Date: 10/18/2024    Discharge Date and Time: No discharge date for patient encounter.    Attending Physician: Juancarlos Bee MD     Discharge Provider: Juancarlos Bee    Final Diagnosis: Post-Op Diagnosis Codes:     * Tonsillar hypertrophy [J35.1]    Disposition: Home or Self Care, discharged in good condition    Follow Up/Patient Instructions:       Medications:  Reconciled Home Medications:   Current Discharge Medication List        CONTINUE these medications which have NOT CHANGED    Details   Lactobacillus acidophilus (PROBIOTIC ACIDOPHILUS ORAL) Take by mouth.      semaglutide (OZEMPIC) 2 mg/dose (8 mg/3 mL) PnIj Inject 2 mg into the skin every 7 days. Dx e88.81  Qty: 3 mL, Refills: 5           No discharge procedures on file.     [address] [id number]

## 2024-10-18 NOTE — DISCHARGE INSTRUCTIONS
DEPARTMENT OF OTOLARYNGOLOGY, HEAD AND NECK SURGERY      MD Juancarlos Torres MD Maria Carratola, MD Alan Sticker, MD            CONTACT   PHONE:   [phone] [address] [facility]   [place] [address]         Patient Instructions After Tonsillectomy       What to expect after surgery:   Pain/Sore throat: This can last anywhere from 1-2 weeks. Often the pain will be worse about 1 week after surgery and then improve after that point.   Fever: This may happen during the first 1-2 days after surgery. If you have a temperature greater than 101 that does not respond to treatment with your oral pain medication/Tylenol, notify your MD.   Ear pain: It is very common to have referred pain to the ears after a tonsillectomy. This generally does not mean you have any ear issues.   Bad breath: Patients generally have an eschar (scab) that forms in the back of the nose and throat after surgery. This can look like white patches in the back of the throat and can be associated with bad breath.     Diet:   In general, following a soft bland diet will help avoid any postoperative bleeding issues and reduce pain.   Avoid foods with sharp edges (chips, pretzels), take small bites and chew food well   Drinking fluids is very important and you should encourage this throughout the day. Dehydration can lead to worsening pain and can be especially dangerous in children. If children do not take fluids in for 6 hours or more and/or they are not urinating as frequently, call your ENT physician.     Activity:   Avoid any strenuous activity, sports, heavy lifting.   Anything that raises your blood pressure significantly can increase your chance of bleeding after surgery.     Medication:   Pain medication is often necessary after this surgery. Depending on your age, your physician may or may not have prescribed a pain medication.   For children under 6 years of age, we recommend alternating Tylenol and ibuprofen  every 3 hours as needed for pain.   For adults, you can also alternate your prescription pain medicine and ibuprofen every 3 hours as needed for pain.   It is VERY important that you do not take your prescription pain medicine AND additional Tylenol since your prescription pain medicine already has Tylenol in it.     Bleeding:   Bleeding after tonsillectomy is uncommon, but it does happen in a small percentage of patients.   If you have bright red blood coming from the nose and/or mouth after surgery and it doesnt stop immediately, you should contact your physician.   If you have significant amounts of bleeding, you should contact your physician and make arrangements to be evaluated in the emergency room.   Bleeding is most common 7-10 days after surgery when the eschar (scabs) fall off in the back of the throat where the surgery was performed.

## 2024-10-21 VITALS
HEART RATE: 79 BPM | BODY MASS INDEX: 24.02 KG/M2 | HEIGHT: 70 IN | DIASTOLIC BLOOD PRESSURE: 81 MMHG | WEIGHT: 167.75 LBS | RESPIRATION RATE: 17 BRPM | SYSTOLIC BLOOD PRESSURE: 123 MMHG | TEMPERATURE: 98 F | OXYGEN SATURATION: 100 %

## 2024-10-23 LAB
FINAL PATHOLOGIC DIAGNOSIS: NORMAL
GROSS: NORMAL
Lab: NORMAL

## 2024-10-28 ENCOUNTER — OFFICE VISIT (OUTPATIENT)
Dept: OBSTETRICS AND GYNECOLOGY | Facility: CLINIC | Age: 39
End: 2024-10-28
Payer: COMMERCIAL

## 2024-10-28 ENCOUNTER — LAB VISIT (OUTPATIENT)
Dept: LAB | Facility: HOSPITAL | Age: 39
End: 2024-10-28
Attending: NURSE PRACTITIONER
Payer: COMMERCIAL

## 2024-10-28 VITALS
BODY MASS INDEX: 23.86 KG/M2 | WEIGHT: 166.69 LBS | SYSTOLIC BLOOD PRESSURE: 110 MMHG | DIASTOLIC BLOOD PRESSURE: 70 MMHG | HEIGHT: 70 IN

## 2024-10-28 DIAGNOSIS — Z11.3 SCREEN FOR STD (SEXUALLY TRANSMITTED DISEASE): ICD-10-CM

## 2024-10-28 DIAGNOSIS — N89.8 VAGINAL ITCHING: ICD-10-CM

## 2024-10-28 DIAGNOSIS — N90.89 IRRITATION OF VULVA: ICD-10-CM

## 2024-10-28 DIAGNOSIS — N89.8 VAGINAL ODOR: Primary | ICD-10-CM

## 2024-10-28 LAB
HAV IGM SERPL QL IA: NORMAL
HBV CORE IGM SERPL QL IA: NORMAL
HBV SURFACE AG SERPL QL IA: NORMAL
HCV AB SERPL QL IA: NORMAL
HIV 1+2 AB+HIV1 P24 AG SERPL QL IA: NORMAL
TREPONEMA PALLIDUM IGG+IGM AB [PRESENCE] IN SERUM OR PLASMA BY IMMUNOASSAY: NONREACTIVE

## 2024-10-28 PROCEDURE — 0352U VAGINOSIS SCREEN BY DNA PROBE: CPT | Performed by: NURSE PRACTITIONER

## 2024-10-28 PROCEDURE — 3078F DIAST BP <80 MM HG: CPT | Mod: CPTII,S$GLB,, | Performed by: NURSE PRACTITIONER

## 2024-10-28 PROCEDURE — 87491 CHLMYD TRACH DNA AMP PROBE: CPT | Performed by: NURSE PRACTITIONER

## 2024-10-28 PROCEDURE — 80074 ACUTE HEPATITIS PANEL: CPT | Performed by: NURSE PRACTITIONER

## 2024-10-28 PROCEDURE — 3044F HG A1C LEVEL LT 7.0%: CPT | Mod: CPTII,S$GLB,, | Performed by: NURSE PRACTITIONER

## 2024-10-28 PROCEDURE — 87389 HIV-1 AG W/HIV-1&-2 AB AG IA: CPT | Performed by: NURSE PRACTITIONER

## 2024-10-28 PROCEDURE — 99214 OFFICE O/P EST MOD 30 MIN: CPT | Mod: S$GLB,,, | Performed by: NURSE PRACTITIONER

## 2024-10-28 PROCEDURE — 99999 PR PBB SHADOW E&M-EST. PATIENT-LVL III: CPT | Mod: PBBFAC,,, | Performed by: NURSE PRACTITIONER

## 2024-10-28 PROCEDURE — 86593 SYPHILIS TEST NON-TREP QUANT: CPT | Performed by: NURSE PRACTITIONER

## 2024-10-28 PROCEDURE — 3074F SYST BP LT 130 MM HG: CPT | Mod: CPTII,S$GLB,, | Performed by: NURSE PRACTITIONER

## 2024-10-28 PROCEDURE — 3008F BODY MASS INDEX DOCD: CPT | Mod: CPTII,S$GLB,, | Performed by: NURSE PRACTITIONER

## 2024-10-28 PROCEDURE — 36415 COLL VENOUS BLD VENIPUNCTURE: CPT | Performed by: NURSE PRACTITIONER

## 2024-10-28 PROCEDURE — 1160F RVW MEDS BY RX/DR IN RCRD: CPT | Mod: CPTII,S$GLB,, | Performed by: NURSE PRACTITIONER

## 2024-10-28 PROCEDURE — 1159F MED LIST DOCD IN RCRD: CPT | Mod: CPTII,S$GLB,, | Performed by: NURSE PRACTITIONER

## 2024-10-28 RX ORDER — FLUCONAZOLE 150 MG/1
TABLET ORAL
Qty: 3 TABLET | Refills: 0 | Status: SHIPPED | OUTPATIENT
Start: 2024-10-28

## 2024-10-28 RX ORDER — METRONIDAZOLE 500 MG/1
500 TABLET ORAL EVERY 12 HOURS
Qty: 14 TABLET | Refills: 0 | Status: SHIPPED | OUTPATIENT
Start: 2024-10-28 | End: 2024-11-04

## 2024-10-28 RX ORDER — CLOTRIMAZOLE AND BETAMETHASONE DIPROPIONATE 10; .64 MG/G; MG/G
CREAM TOPICAL 2 TIMES DAILY
Qty: 45 G | Refills: 2 | Status: SHIPPED | OUTPATIENT
Start: 2024-10-28 | End: 2024-11-27

## 2024-10-29 LAB
BACTERIAL VAGINOSIS DNA: DETECTED
C TRACH DNA SPEC QL NAA+PROBE: NOT DETECTED
CANDIDA GLABRATA/KRUSEI: NOT DETECTED
CANDIDA RRNA VAG QL PROBE: DETECTED
N GONORRHOEA DNA SPEC QL NAA+PROBE: NOT DETECTED
TRICHOMONAS VAGINALIS: NOT DETECTED

## 2024-11-04 ENCOUNTER — OFFICE VISIT (OUTPATIENT)
Dept: OTOLARYNGOLOGY | Facility: CLINIC | Age: 39
End: 2024-11-04
Payer: COMMERCIAL

## 2024-11-04 VITALS — WEIGHT: 161.63 LBS | BODY MASS INDEX: 23.19 KG/M2

## 2024-11-04 DIAGNOSIS — Z90.89 S/P T&A (STATUS POST TONSILLECTOMY AND ADENOIDECTOMY): Primary | ICD-10-CM

## 2024-11-04 PROCEDURE — 99024 POSTOP FOLLOW-UP VISIT: CPT | Mod: S$GLB,,, | Performed by: PHYSICIAN ASSISTANT

## 2024-11-04 PROCEDURE — 99999 PR PBB SHADOW E&M-EST. PATIENT-LVL III: CPT | Mod: PBBFAC,,, | Performed by: PHYSICIAN ASSISTANT

## 2024-11-04 PROCEDURE — 3044F HG A1C LEVEL LT 7.0%: CPT | Mod: CPTII,S$GLB,, | Performed by: PHYSICIAN ASSISTANT

## 2024-11-04 PROCEDURE — 1159F MED LIST DOCD IN RCRD: CPT | Mod: CPTII,S$GLB,, | Performed by: PHYSICIAN ASSISTANT

## 2024-11-04 NOTE — PROGRESS NOTES
Subjective:   Patient ID: Carley Carrington is a 38 y.o. female.    Chief Complaint: Follow-up (Post op tonsillectomy - reports no issues)    Patient is a very pleasant 38 year old female here to see me today in followup after recent adenotonsillectomy in the OR 10/18/24.  Her parent reports that the child has been doing well after surgery, and is no longer having any significant postoperative pain.  She reports is doing well.    They have no specific questions or concerns at this time.     Follow-up      Review of patient's allergies indicates:  No Known Allergies        Review of Systems   Constitutional: Negative.    Eyes: Negative.    Respiratory: Negative.     Cardiovascular: Negative.    Gastrointestinal: Negative.    Endocrine: Negative.    Genitourinary: Negative.    Musculoskeletal: Negative.    Skin: Negative.    Allergic/Immunologic: Negative.    Neurological: Negative.    Hematological: Negative.    Psychiatric/Behavioral: Negative.           Objective:   Wt 73.3 kg (161 lb 9.6 oz)   LMP 10/04/2024 (Approximate)   BMI 23.19 kg/m²     Physical Exam  Constitutional:       General: She is not in acute distress.     Appearance: She is well-developed.   HENT:      Head: Normocephalic and atraumatic.      Right Ear: Tympanic membrane, ear canal and external ear normal.      Left Ear: Tympanic membrane, ear canal and external ear normal.      Nose: Nose normal. No nasal deformity, septal deviation, mucosal edema or rhinorrhea.      Right Sinus: No maxillary sinus tenderness or frontal sinus tenderness.      Left Sinus: No maxillary sinus tenderness or frontal sinus tenderness.      Mouth/Throat:      Mouth: Mucous membranes are not pale and not dry.      Dentition: No dental caries.      Pharynx: Uvula midline. No oropharyngeal exudate or posterior oropharyngeal erythema.      Tonsils: 0 on the right. 0 on the left.      Comments: Healing tissue overlying tonsil cavity    Eyes:      General: Lids are normal. No  scleral icterus.     Extraocular Movements:      Right eye: Normal extraocular motion and no nystagmus.      Left eye: Normal extraocular motion and no nystagmus.      Conjunctiva/sclera: Conjunctivae normal.      Right eye: Right conjunctiva is not injected. No chemosis.     Left eye: Left conjunctiva is not injected. No chemosis.     Pupils: Pupils are equal, round, and reactive to light.   Neck:      Thyroid: No thyroid mass or thyromegaly.      Trachea: Trachea and phonation normal. No tracheal tenderness or tracheal deviation.   Pulmonary:      Effort: Pulmonary effort is normal. No respiratory distress.      Breath sounds: No stridor.   Abdominal:      General: There is no distension.   Lymphadenopathy:      Head:      Right side of head: No submental, submandibular, preauricular, posterior auricular or occipital adenopathy.      Left side of head: No submental, submandibular, preauricular, posterior auricular or occipital adenopathy.      Cervical: No cervical adenopathy.   Skin:     General: Skin is warm and dry.      Findings: No erythema or rash.   Neurological:      Mental Status: She is alert and oriented to person, place, and time.      Cranial Nerves: No cranial nerve deficit.   Psychiatric:         Behavior: Behavior normal.              Assessment:     1. S/P T&A (status post tonsillectomy and adenoidectomy)        Plan:     S/P T&A (status post tonsillectomy and adenoidectomy)      She is healing well and is tolerating normal diet and activity. She may follow up with ENT as needed.

## 2024-11-20 ENCOUNTER — OFFICE VISIT (OUTPATIENT)
Dept: OBSTETRICS AND GYNECOLOGY | Facility: CLINIC | Age: 39
End: 2024-11-20
Payer: COMMERCIAL

## 2024-11-20 ENCOUNTER — LAB VISIT (OUTPATIENT)
Dept: LAB | Facility: HOSPITAL | Age: 39
End: 2024-11-20
Attending: OBSTETRICS & GYNECOLOGY
Payer: COMMERCIAL

## 2024-11-20 ENCOUNTER — TELEPHONE (OUTPATIENT)
Dept: PREADMISSION TESTING | Facility: HOSPITAL | Age: 39
End: 2024-11-20
Payer: COMMERCIAL

## 2024-11-20 VITALS
BODY MASS INDEX: 23.45 KG/M2 | WEIGHT: 163.81 LBS | HEIGHT: 70 IN | DIASTOLIC BLOOD PRESSURE: 96 MMHG | SYSTOLIC BLOOD PRESSURE: 140 MMHG

## 2024-11-20 DIAGNOSIS — Z30.2 ENCOUNTER FOR STERILIZATION: ICD-10-CM

## 2024-11-20 DIAGNOSIS — Z30.2 REQUEST FOR STERILIZATION: Primary | ICD-10-CM

## 2024-11-20 DIAGNOSIS — N83.201 CYST OF RIGHT OVARY: ICD-10-CM

## 2024-11-20 DIAGNOSIS — Z30.432 ENCOUNTER FOR REMOVAL OF INTRAUTERINE CONTRACEPTIVE DEVICE (IUD): ICD-10-CM

## 2024-11-20 LAB
ANION GAP SERPL CALC-SCNC: 8 MMOL/L (ref 8–16)
BASOPHILS # BLD AUTO: 0.04 K/UL (ref 0–0.2)
BASOPHILS NFR BLD: 0.8 % (ref 0–1.9)
BUN SERPL-MCNC: 7 MG/DL (ref 6–20)
CALCIUM SERPL-MCNC: 9.2 MG/DL (ref 8.7–10.5)
CHLORIDE SERPL-SCNC: 107 MMOL/L (ref 95–110)
CO2 SERPL-SCNC: 25 MMOL/L (ref 23–29)
CREAT SERPL-MCNC: 0.9 MG/DL (ref 0.5–1.4)
DIFFERENTIAL METHOD BLD: ABNORMAL
EOSINOPHIL # BLD AUTO: 0.1 K/UL (ref 0–0.5)
EOSINOPHIL NFR BLD: 1 % (ref 0–8)
ERYTHROCYTE [DISTWIDTH] IN BLOOD BY AUTOMATED COUNT: 13.8 % (ref 11.5–14.5)
EST. GFR  (NO RACE VARIABLE): >60 ML/MIN/1.73 M^2
GLUCOSE SERPL-MCNC: 81 MG/DL (ref 70–110)
HCG INTACT+B SERPL-ACNC: <2.4 MIU/ML
HCT VFR BLD AUTO: 39.6 % (ref 37–48.5)
HGB BLD-MCNC: 13.1 G/DL (ref 12–16)
IMM GRANULOCYTES # BLD AUTO: 0 K/UL (ref 0–0.04)
IMM GRANULOCYTES NFR BLD AUTO: 0 % (ref 0–0.5)
LYMPHOCYTES # BLD AUTO: 2.7 K/UL (ref 1–4.8)
LYMPHOCYTES NFR BLD: 51.2 % (ref 18–48)
MCH RBC QN AUTO: 27 PG (ref 27–31)
MCHC RBC AUTO-ENTMCNC: 33.1 G/DL (ref 32–36)
MCV RBC AUTO: 82 FL (ref 82–98)
MONOCYTES # BLD AUTO: 0.3 K/UL (ref 0.3–1)
MONOCYTES NFR BLD: 5.3 % (ref 4–15)
NEUTROPHILS # BLD AUTO: 2.2 K/UL (ref 1.8–7.7)
NEUTROPHILS NFR BLD: 41.7 % (ref 38–73)
NRBC BLD-RTO: 0 /100 WBC
PLATELET # BLD AUTO: 300 K/UL (ref 150–450)
PMV BLD AUTO: 10.5 FL (ref 9.2–12.9)
POTASSIUM SERPL-SCNC: 3.9 MMOL/L (ref 3.5–5.1)
RBC # BLD AUTO: 4.86 M/UL (ref 4–5.4)
SODIUM SERPL-SCNC: 140 MMOL/L (ref 136–145)
WBC # BLD AUTO: 5.25 K/UL (ref 3.9–12.7)

## 2024-11-20 PROCEDURE — 99213 OFFICE O/P EST LOW 20 MIN: CPT | Mod: 25,S$GLB,, | Performed by: OBSTETRICS & GYNECOLOGY

## 2024-11-20 PROCEDURE — 3008F BODY MASS INDEX DOCD: CPT | Mod: CPTII,S$GLB,, | Performed by: OBSTETRICS & GYNECOLOGY

## 2024-11-20 PROCEDURE — 36415 COLL VENOUS BLD VENIPUNCTURE: CPT | Performed by: OBSTETRICS & GYNECOLOGY

## 2024-11-20 PROCEDURE — 3077F SYST BP >= 140 MM HG: CPT | Mod: CPTII,S$GLB,, | Performed by: OBSTETRICS & GYNECOLOGY

## 2024-11-20 PROCEDURE — 58301 REMOVE INTRAUTERINE DEVICE: CPT | Mod: S$GLB,,, | Performed by: OBSTETRICS & GYNECOLOGY

## 2024-11-20 PROCEDURE — 3080F DIAST BP >= 90 MM HG: CPT | Mod: CPTII,S$GLB,, | Performed by: OBSTETRICS & GYNECOLOGY

## 2024-11-20 PROCEDURE — 84702 CHORIONIC GONADOTROPIN TEST: CPT | Performed by: OBSTETRICS & GYNECOLOGY

## 2024-11-20 PROCEDURE — 1159F MED LIST DOCD IN RCRD: CPT | Mod: CPTII,S$GLB,, | Performed by: OBSTETRICS & GYNECOLOGY

## 2024-11-20 PROCEDURE — 85025 COMPLETE CBC W/AUTO DIFF WBC: CPT | Performed by: OBSTETRICS & GYNECOLOGY

## 2024-11-20 PROCEDURE — 3044F HG A1C LEVEL LT 7.0%: CPT | Mod: CPTII,S$GLB,, | Performed by: OBSTETRICS & GYNECOLOGY

## 2024-11-20 PROCEDURE — 99999 PR PBB SHADOW E&M-EST. PATIENT-LVL III: CPT | Mod: PBBFAC,,, | Performed by: OBSTETRICS & GYNECOLOGY

## 2024-11-20 PROCEDURE — 80048 BASIC METABOLIC PNL TOTAL CA: CPT | Performed by: OBSTETRICS & GYNECOLOGY

## 2024-11-20 NOTE — H&P (VIEW-ONLY)
"History & Physical    Subjective     History of Present Illness:  Patient is a 38 y.o. female   presents with desire for permanent sterilization;  Adamantly does not want more children.  Also found to have cyst on ovary.     Chief Complaint   Patient presents with    Pre-op Exam       Review of patient's allergies indicates:  No Known Allergies    Current Outpatient Medications   Medication Sig Dispense Refill    Lactobacillus acidophilus (PROBIOTIC ACIDOPHILUS ORAL) Take by mouth.      semaglutide (OZEMPIC) 2 mg/dose (8 mg/3 mL) PnIj Inject 2 mg into the skin every 7 days. Dx e88.81 (Patient not taking: Reported on 2024) 3 mL 5     No current facility-administered medications for this visit.       Past Medical History:   Diagnosis Date    Benign tumor of heart     Enlarged heart      Past Surgical History:   Procedure Laterality Date    TONSILLECTOMY N/A 10/18/2024    Procedure: TONSILLECTOMY;  Surgeon: Juancarlos Bee MD;  Location: [facility];  Service: ENT;  Laterality: N/A;     Family History   Problem Relation Name Age of Onset    Diabetes Mother      Hypertension Mother      Heart disease Father      Lupus Sister      Diabetes Maternal Grandmother      Lupus Maternal Grandmother      Hypertension Maternal Grandmother      Lupus Maternal Aunt       Social History     Tobacco Use    Smoking status: Never    Smokeless tobacco: Never   Substance Use Topics    Alcohol use: Never    Drug use: Never        Review of Systems:  Review of Systems   All other systems reviewed and are negative.         Objective     Vital Signs (Most Recent)  BP: (!) 140/96 (24 1458)  5' 10" (1.778 m)  74.3 kg (163 lb 12.8 oz)     Physical Exam:  Physical Exam  Vitals reviewed.   Constitutional:       Appearance: She is well-developed.   HENT:      Head: Normocephalic.   Eyes:      Conjunctiva/sclera: Conjunctivae normal.      Pupils: Pupils are equal, round, and reactive to light.   Cardiovascular:      Rate and Rhythm: " Normal rate.   Pulmonary:      Effort: Pulmonary effort is normal.      Breath sounds: Normal breath sounds.   Abdominal:      Palpations: Abdomen is soft.   Musculoskeletal:         General: Normal range of motion.      Cervical back: Normal range of motion.   Skin:     General: Skin is warm and dry.   Neurological:      Mental Status: She is alert and oriented to person, place, and time.   Psychiatric:         Behavior: Behavior normal.         Thought Content: Thought content normal.         Judgment: Judgment normal.         Laboratory  Cbc, bmp, hcg to be drawn preop    Diagnostic Results:  US: Reviewed    The uterus measures 9.4 cm in length. An intrauterine device is in place and appears to be appropriately positioned within the endometrial canal.  The endometrial stripe in this premenopausal patient measures 2.3 mm which is within normal limits.  The right ovary measures 6.8 x 5.3 x 5.8 cm.  The left ovary measures 3.2 x 1.3 x 2.3 cm.    Vascular flow demonstrated to both ovaries.  There is some physiologic free fluid adjacent to the left ovary.     Impression:     1. Intrauterine device in place and appears to be appropriately positioned.  2. 6.6 cm simple right ovarian cyst.     Assessment and Plan   Encounter Diagnoses   Name Primary?    Encounter for removal of intrauterine contraceptive device (IUD)     Request for sterilization Yes    Cyst of right ovary          PLAN:  Reviewed laparoscopic tubal ligation procedure in detail--complete removal of tubes.  Reviewed risks including but not limited to infection, bleeding, damage to bowel/bladder,/ureter cva;htn, dvt, death. Pt aware procedure is permanent and cannot be reversed.  Pt aware risk of failure 3-5/1000; slightly increased risk for pregnancy in tube; pt aware needs pregnancy test if skips menses.  may see a change in menses--heavier, irregular,  All questions answered to the best of my ability.  Alternatives reviewed --condoms, ocp, mirena, depo,  nuva ring, nexplanon, abstinence.  Aware if cyst present will attempt removal; (cystectomy);  aware of risk of oophorectomy.  Consents signed and witnessed.  Post op course reviewed

## 2024-11-20 NOTE — PROGRESS NOTES
"History & Physical    Subjective     History of Present Illness:  Patient is a 38 y.o. female   presents with desire for permanent sterilization;  Adamantly does not want more children.  Also found to have cyst on ovary.     Chief Complaint   Patient presents with    Pre-op Exam       Review of patient's allergies indicates:  No Known Allergies    Current Outpatient Medications   Medication Sig Dispense Refill    Lactobacillus acidophilus (PROBIOTIC ACIDOPHILUS ORAL) Take by mouth.      semaglutide (OZEMPIC) 2 mg/dose (8 mg/3 mL) PnIj Inject 2 mg into the skin every 7 days. Dx e88.81 (Patient not taking: Reported on 2024) 3 mL 5     No current facility-administered medications for this visit.       Past Medical History:   Diagnosis Date    Benign tumor of heart     Enlarged heart      Past Surgical History:   Procedure Laterality Date    TONSILLECTOMY N/A 10/18/2024    Procedure: TONSILLECTOMY;  Surgeon: Juancarlos Bee MD;  Location: [facility];  Service: ENT;  Laterality: N/A;     Family History   Problem Relation Name Age of Onset    Diabetes Mother      Hypertension Mother      Heart disease Father      Lupus Sister      Diabetes Maternal Grandmother      Lupus Maternal Grandmother      Hypertension Maternal Grandmother      Lupus Maternal Aunt       Social History     Tobacco Use    Smoking status: Never    Smokeless tobacco: Never   Substance Use Topics    Alcohol use: Never    Drug use: Never        Review of Systems:  Review of Systems   All other systems reviewed and are negative.         Objective     Vital Signs (Most Recent)  BP: (!) 140/96 (24 1458)  5' 10" (1.778 m)  74.3 kg (163 lb 12.8 oz)     Physical Exam:  Physical Exam  Vitals reviewed.   Constitutional:       Appearance: She is well-developed.   HENT:      Head: Normocephalic.   Eyes:      Conjunctiva/sclera: Conjunctivae normal.      Pupils: Pupils are equal, round, and reactive to light.   Cardiovascular:      Rate and Rhythm: " Normal rate.   Pulmonary:      Effort: Pulmonary effort is normal.      Breath sounds: Normal breath sounds.   Abdominal:      Palpations: Abdomen is soft.   Musculoskeletal:         General: Normal range of motion.      Cervical back: Normal range of motion.   Skin:     General: Skin is warm and dry.   Neurological:      Mental Status: She is alert and oriented to person, place, and time.   Psychiatric:         Behavior: Behavior normal.         Thought Content: Thought content normal.         Judgment: Judgment normal.         Laboratory  Cbc, bmp, hcg to be drawn preop    Diagnostic Results:  US: Reviewed    The uterus measures 9.4 cm in length. An intrauterine device is in place and appears to be appropriately positioned within the endometrial canal.  The endometrial stripe in this premenopausal patient measures 2.3 mm which is within normal limits.  The right ovary measures 6.8 x 5.3 x 5.8 cm.  The left ovary measures 3.2 x 1.3 x 2.3 cm.    Vascular flow demonstrated to both ovaries.  There is some physiologic free fluid adjacent to the left ovary.     Impression:     1. Intrauterine device in place and appears to be appropriately positioned.  2. 6.6 cm simple right ovarian cyst.     Assessment and Plan   Encounter Diagnoses   Name Primary?    Encounter for removal of intrauterine contraceptive device (IUD)     Request for sterilization Yes    Cyst of right ovary          PLAN:  Reviewed laparoscopic tubal ligation procedure in detail--complete removal of tubes.  Reviewed risks including but not limited to infection, bleeding, damage to bowel/bladder,/ureter cva;htn, dvt, death. Pt aware procedure is permanent and cannot be reversed.  Pt aware risk of failure 3-5/1000; slightly increased risk for pregnancy in tube; pt aware needs pregnancy test if skips menses.  may see a change in menses--heavier, irregular,  All questions answered to the best of my ability.  Alternatives reviewed --condoms, ocp, mirena, depo,  nuva ring, nexplanon, abstinence.  Aware if cyst present will attempt removal; (cystectomy);  aware of risk of oophorectomy.  Consents signed and witnessed.  Post op course reviewed

## 2024-11-21 NOTE — PROCEDURES
Removal of IUD    Date/Time: 11/20/2024 3:00 PM    Performed by: Jannet Chavez MD  Authorized by: Jannet Chavez MD    Consent obtained:  Prior to procedure the appropriate consent was completed and verified  Consent given by:  Patient  Procedure risks and benefits discussed: yes    Patient questions answered: yes    Patient agrees, verbalizes understanding, and wants to proceed: yes    Implant grasped by: ring forceps  Other reason for removal:  Wants permanent sterilization  Removed with no complications: yes

## 2024-11-25 ENCOUNTER — TELEPHONE (OUTPATIENT)
Dept: PREADMISSION TESTING | Facility: HOSPITAL | Age: 39
End: 2024-11-25
Payer: COMMERCIAL

## 2024-11-25 NOTE — TELEPHONE ENCOUNTER
Pre op instructions reviewed with Pt over telephone, verbalized understanding.    Procedure Date: 11/27/24  Arrival Time:  TBD; We will call you after 2pm the day before your procedure with your arrival time.    Address:   [facility] (Off [facility], 2nd Building on the left)  [id number] [place] [address] [place]. [address]  >>>Please enter through front entrance Lobby of 1st floor to Registration desk<<<      !!!INSTRUCTIONS IMPORTANT!!!  NO FOOD or tobacco products after midnight the night before surgery! You may have clear liquids up to 3 hrs before your arrival to the Hospital  Clear liquids include Gatorade, water, soda, black coffee or tea (no milk or creamer), and clear juices.  Clear liquids do NOT include anything with pulp or food particles (Chicken broth, ice cream, yogurt, Jello, etc.)    >>>MEDICATION INSTRUCTIONS<<<: Morning of Surgery, take small sip of water with ONLY these medications:  NONE    *Diabetic/ Prediabetic Patients: !!!If you take diabetic or weight loss medication, Do NOT take morning of surgery unless instructed by Doctor!!!  Metformin to be stopped 24 hrs prior to surgery.   Long Acting Insulin Instructions: HOLD the night before surgery unless instructed differently by Provider!  Ozempic/ Mounjaro/ Wegovy/ Trulicity/ Semaglutide injections or weight loss medication to be stopped 7 days prior to surgery.    !!!STOP ALL Aspirins, NSAIDS, WEIGHT LOSS INJECTIONS/PILLS, Herbal supplements, & Vitamins 7 DAYS BEFORE SURGERY!!!    ____  Avoid Alcoholic beverages 3 days prior to surgery, as it can thin the blood.  ____  NO Acrylic/fake nails or nail polish worn day of surgery (specifically hand/arm & foot surgeries).  ____  NO powder, lotions, deodorants, oils or cream on body.  ____  Remove all jewelry & piercings & foreign objects before arrival & leave at home.  ____  Remove Dentures, Hearing Aids & Contact Lens prior to surgery.  ____  Bring photo ID and insurance information to  hospital (Leave Valuables at Home).  ____  If going home the same day, arrange for a ride home. You will not be able to drive for 24 hrs if Anesthesia was used.   ____  Females (ages 11-60): may need to give a urine sample the morning of surgery; please see Pre op Nurse prior to using the restroom.  ____  Males: Stop ED medications (Viagra, Cialis) 24 hrs prior to surgery.  ____  Wear clean, loose fitting clothing to allow for dressings/ bandages.      Bathing Instructions:    -Shower with anti-bacterial Soap (Hibiclens or Dial) the night before surgery and the morning of.   -Do not use Hibiclens on your face or genitals.   -Apply clean clothes after shower.  -Do not shave your face or body 2 days prior to surgery unless instructed otherwise by your Surgeon.  -Do not shave pubic hair 7 days prior to surgery (gyn pt's).    [facility] Visitor/Ride Policy:  Only 2 adults allowed in pre op/recovery area during your procedure. You MUST HAVE A RIDE HOME from a responsible adult that you know and trust. Medical Transport, Uber or Lyft can ONLY be used if patient has a responsible adult to accompany them during ride home.       *Signs and symptoms of Infection Before or After Surgery:               !!!If you experience any fever, chills, nausea/ vomiting, foul odor/ excessive drainage from surgical site, flu-like symptoms, new wounds or cuts, PLEASE CALL THE SURGEON OFFICE at [phone] or SEND MESSAGE THROUGH [facility]!!!     *If you are running late the morning of surgery, please call the Hospital Surgery Dept @ [phone].     *Billing questions:  [phone]     Thank you,  -[facility] Pre Admit Dept.  [phone] or [phone]  M-F 7:30 am-4:00 pm (Closed Major Holidays)

## 2024-11-26 ENCOUNTER — TELEPHONE (OUTPATIENT)
Dept: PREADMISSION TESTING | Facility: HOSPITAL | Age: 39
End: 2024-11-26
Payer: COMMERCIAL

## 2024-11-26 NOTE — TELEPHONE ENCOUNTER
Called and spoke with patient about the following:     Please arrive to [facility] ([facility]) at 8:30AM on 11/27/2024 for your scheduled procedure.  Address: [address] [place] [address] [place]. [address] (2nd Building on left, 1st Floor Lobby)    !!!NO FOOD after midnight! You may have clear liquids up to 3 hrs before your arrival to the Hospital!!!  Clear liquids include Gatorade, water, soda, black coffee or tea (no milk or creamer), and clear juices.  Clear liquids do NOT include anything with pulp or food particles (Chicken broth, ice cream, yogurt, Jello, etc.)    Thank you,  -[facility] Pre Admit Dept.  Mon-Fri 7:30 am - 4 pm [phone]

## 2024-11-27 ENCOUNTER — HOSPITAL ENCOUNTER (OUTPATIENT)
Facility: HOSPITAL | Age: 39
Discharge: HOME | End: 2024-11-27
Attending: OBSTETRICS & GYNECOLOGY | Admitting: OBSTETRICS & GYNECOLOGY
Payer: COMMERCIAL

## 2024-11-27 ENCOUNTER — ANESTHESIA EVENT (OUTPATIENT)
Dept: SURGERY | Facility: HOSPITAL | Age: 39
End: 2024-11-27
Payer: COMMERCIAL

## 2024-11-27 ENCOUNTER — ANESTHESIA (OUTPATIENT)
Dept: SURGERY | Facility: HOSPITAL | Age: 39
End: 2024-11-27
Payer: COMMERCIAL

## 2024-11-27 DIAGNOSIS — Z01.818 PREOP TESTING: Primary | ICD-10-CM

## 2024-11-27 DIAGNOSIS — Z30.2 ENCOUNTER FOR FEMALE STERILIZATION PROCEDURE: ICD-10-CM

## 2024-11-27 DIAGNOSIS — Z90.79 STATUS POST BILATERAL SALPINGECTOMY: ICD-10-CM

## 2024-11-27 PROBLEM — E88.810 DYSMETABOLIC SYNDROME X: Status: RESOLVED | Noted: 2023-12-29 | Resolved: 2024-11-27

## 2024-11-27 LAB
B-HCG UR QL: NEGATIVE
CTP QC/QA: YES

## 2024-11-27 PROCEDURE — 88302 TISSUE EXAM BY PATHOLOGIST: CPT | Mod: 59 | Performed by: PATHOLOGY

## 2024-11-27 PROCEDURE — 37000008 HC ANESTHESIA 1ST 15 MINUTES: Performed by: OBSTETRICS & GYNECOLOGY

## 2024-11-27 PROCEDURE — 63600175 PHARM REV CODE 636 W HCPCS: Performed by: NURSE ANESTHETIST, CERTIFIED REGISTERED

## 2024-11-27 PROCEDURE — 36000709 HC OR TIME LEV III EA ADD 15 MIN: Performed by: OBSTETRICS & GYNECOLOGY

## 2024-11-27 PROCEDURE — 27201423 OPTIME MED/SURG SUP & DEVICES STERILE SUPPLY: Performed by: OBSTETRICS & GYNECOLOGY

## 2024-11-27 PROCEDURE — 36000708 HC OR TIME LEV III 1ST 15 MIN: Performed by: OBSTETRICS & GYNECOLOGY

## 2024-11-27 PROCEDURE — 37000009 HC ANESTHESIA EA ADD 15 MINS: Performed by: OBSTETRICS & GYNECOLOGY

## 2024-11-27 PROCEDURE — 63600175 PHARM REV CODE 636 W HCPCS: Performed by: OBSTETRICS & GYNECOLOGY

## 2024-11-27 PROCEDURE — 25000003 PHARM REV CODE 250: Performed by: OBSTETRICS & GYNECOLOGY

## 2024-11-27 PROCEDURE — 25000003 PHARM REV CODE 250: Performed by: NURSE ANESTHETIST, CERTIFIED REGISTERED

## 2024-11-27 PROCEDURE — 81025 URINE PREGNANCY TEST: CPT | Performed by: OBSTETRICS & GYNECOLOGY

## 2024-11-27 PROCEDURE — 71000015 HC POSTOP RECOV 1ST HR: Performed by: OBSTETRICS & GYNECOLOGY

## 2024-11-27 PROCEDURE — 58661 LAPAROSCOPY REMOVE ADNEXA: CPT | Mod: 50,,, | Performed by: OBSTETRICS & GYNECOLOGY

## 2024-11-27 PROCEDURE — 71000033 HC RECOVERY, INTIAL HOUR: Performed by: OBSTETRICS & GYNECOLOGY

## 2024-11-27 RX ORDER — DIPHENHYDRAMINE HYDROCHLORIDE 50 MG/ML
25 INJECTION INTRAMUSCULAR; INTRAVENOUS EVERY 4 HOURS PRN
Status: CANCELLED | OUTPATIENT
Start: 2024-11-27

## 2024-11-27 RX ORDER — ONDANSETRON HYDROCHLORIDE 2 MG/ML
INJECTION, SOLUTION INTRAVENOUS
Status: DISCONTINUED | OUTPATIENT
Start: 2024-11-27 | End: 2024-11-27

## 2024-11-27 RX ORDER — HYDROCODONE BITARTRATE AND ACETAMINOPHEN 5; 325 MG/1; MG/1
1 TABLET ORAL EVERY 4 HOURS PRN
Qty: 8 TABLET | Refills: 0 | Status: SHIPPED | OUTPATIENT
Start: 2024-11-27

## 2024-11-27 RX ORDER — ROCURONIUM BROMIDE 10 MG/ML
INJECTION, SOLUTION INTRAVENOUS
Status: DISCONTINUED | OUTPATIENT
Start: 2024-11-27 | End: 2024-11-27

## 2024-11-27 RX ORDER — CHLORHEXIDINE GLUCONATE ORAL RINSE 1.2 MG/ML
10 SOLUTION DENTAL
Status: DISCONTINUED | OUTPATIENT
Start: 2024-11-27 | End: 2024-11-27 | Stop reason: HOSPADM

## 2024-11-27 RX ORDER — FENTANYL CITRATE 50 UG/ML
INJECTION, SOLUTION INTRAMUSCULAR; INTRAVENOUS
Status: DISCONTINUED | OUTPATIENT
Start: 2024-11-27 | End: 2024-11-27

## 2024-11-27 RX ORDER — ACETAMINOPHEN 500 MG
1000 TABLET ORAL
Status: COMPLETED | OUTPATIENT
Start: 2024-11-27 | End: 2024-11-27

## 2024-11-27 RX ORDER — OXYCODONE AND ACETAMINOPHEN 5; 325 MG/1; MG/1
1 TABLET ORAL
Status: DISCONTINUED | OUTPATIENT
Start: 2024-11-27 | End: 2024-11-27 | Stop reason: HOSPADM

## 2024-11-27 RX ORDER — KETOROLAC TROMETHAMINE 30 MG/ML
INJECTION, SOLUTION INTRAMUSCULAR; INTRAVENOUS
Status: DISCONTINUED | OUTPATIENT
Start: 2024-11-27 | End: 2024-11-27

## 2024-11-27 RX ORDER — ONDANSETRON HYDROCHLORIDE 2 MG/ML
4 INJECTION, SOLUTION INTRAVENOUS DAILY PRN
Status: DISCONTINUED | OUTPATIENT
Start: 2024-11-27 | End: 2024-11-27 | Stop reason: HOSPADM

## 2024-11-27 RX ORDER — MIDAZOLAM HYDROCHLORIDE 1 MG/ML
INJECTION INTRAMUSCULAR; INTRAVENOUS
Status: DISCONTINUED | OUTPATIENT
Start: 2024-11-27 | End: 2024-11-27

## 2024-11-27 RX ORDER — LIDOCAINE HYDROCHLORIDE 20 MG/ML
INJECTION INTRAVENOUS
Status: DISCONTINUED | OUTPATIENT
Start: 2024-11-27 | End: 2024-11-27

## 2024-11-27 RX ORDER — HYDROCODONE BITARTRATE AND ACETAMINOPHEN 5; 325 MG/1; MG/1
1 TABLET ORAL EVERY 4 HOURS PRN
Status: CANCELLED | OUTPATIENT
Start: 2024-11-27

## 2024-11-27 RX ORDER — DEXAMETHASONE SODIUM PHOSPHATE 4 MG/ML
INJECTION, SOLUTION INTRA-ARTICULAR; INTRALESIONAL; INTRAMUSCULAR; INTRAVENOUS; SOFT TISSUE
Status: DISCONTINUED | OUTPATIENT
Start: 2024-11-27 | End: 2024-11-27

## 2024-11-27 RX ORDER — FENTANYL CITRATE 50 UG/ML
25 INJECTION, SOLUTION INTRAMUSCULAR; INTRAVENOUS EVERY 5 MIN PRN
Status: DISCONTINUED | OUTPATIENT
Start: 2024-11-27 | End: 2024-11-27 | Stop reason: HOSPADM

## 2024-11-27 RX ORDER — IBUPROFEN 800 MG/1
800 TABLET ORAL 3 TIMES DAILY
Qty: 15 TABLET | Refills: 0 | Status: SHIPPED | OUTPATIENT
Start: 2024-11-27 | End: 2024-12-02

## 2024-11-27 RX ORDER — ONDANSETRON 8 MG/1
8 TABLET, ORALLY DISINTEGRATING ORAL EVERY 8 HOURS PRN
Status: DISCONTINUED | OUTPATIENT
Start: 2024-11-27 | End: 2024-11-27 | Stop reason: HOSPADM

## 2024-11-27 RX ORDER — SODIUM CHLORIDE 9 MG/ML
INJECTION, SOLUTION INTRAVENOUS CONTINUOUS
Status: DISCONTINUED | OUTPATIENT
Start: 2024-11-27 | End: 2024-11-27 | Stop reason: HOSPADM

## 2024-11-27 RX ORDER — PROPOFOL 10 MG/ML
VIAL (ML) INTRAVENOUS
Status: DISCONTINUED | OUTPATIENT
Start: 2024-11-27 | End: 2024-11-27

## 2024-11-27 RX ORDER — ONDANSETRON HYDROCHLORIDE 2 MG/ML
4 INJECTION, SOLUTION INTRAVENOUS ONCE AS NEEDED
Status: DISCONTINUED | OUTPATIENT
Start: 2024-11-27 | End: 2024-11-27 | Stop reason: HOSPADM

## 2024-11-27 RX ORDER — FAMOTIDINE 20 MG/1
20 TABLET, FILM COATED ORAL
Status: COMPLETED | OUTPATIENT
Start: 2024-11-27 | End: 2024-11-27

## 2024-11-27 RX ORDER — SUCCINYLCHOLINE CHLORIDE 20 MG/ML
INJECTION INTRAMUSCULAR; INTRAVENOUS
Status: DISCONTINUED | OUTPATIENT
Start: 2024-11-27 | End: 2024-11-27

## 2024-11-27 RX ORDER — HYDROMORPHONE HYDROCHLORIDE 1 MG/ML
0.2 INJECTION, SOLUTION INTRAMUSCULAR; INTRAVENOUS; SUBCUTANEOUS EVERY 5 MIN PRN
Status: DISCONTINUED | OUTPATIENT
Start: 2024-11-27 | End: 2024-11-27 | Stop reason: HOSPADM

## 2024-11-27 RX ORDER — CEFAZOLIN 2 G/1
2 INJECTION, POWDER, FOR SOLUTION INTRAMUSCULAR; INTRAVENOUS
Status: COMPLETED | OUTPATIENT
Start: 2024-11-27 | End: 2024-11-27

## 2024-11-27 RX ORDER — MEPERIDINE HYDROCHLORIDE 25 MG/ML
12.5 INJECTION INTRAMUSCULAR; INTRAVENOUS; SUBCUTANEOUS ONCE AS NEEDED
Status: DISCONTINUED | OUTPATIENT
Start: 2024-11-27 | End: 2024-11-27 | Stop reason: HOSPADM

## 2024-11-27 RX ADMIN — ROCURONIUM BROMIDE 5 MG: 10 INJECTION, SOLUTION INTRAVENOUS at 10:11

## 2024-11-27 RX ADMIN — MIDAZOLAM HYDROCHLORIDE 2 MG: 1 INJECTION, SOLUTION INTRAMUSCULAR; INTRAVENOUS at 10:11

## 2024-11-27 RX ADMIN — DEXAMETHASONE SODIUM PHOSPHATE 8 MG: 4 INJECTION, SOLUTION INTRA-ARTICULAR; INTRALESIONAL; INTRAMUSCULAR; INTRAVENOUS; SOFT TISSUE at 10:11

## 2024-11-27 RX ADMIN — CEFAZOLIN 2 G: 2 INJECTION, POWDER, FOR SOLUTION INTRAMUSCULAR; INTRAVENOUS at 10:11

## 2024-11-27 RX ADMIN — SUCCINYLCHOLINE CHLORIDE 120 MG: 20 INJECTION, SOLUTION INTRAMUSCULAR; INTRAVENOUS; PARENTERAL at 10:11

## 2024-11-27 RX ADMIN — ROCURONIUM BROMIDE 25 MG: 10 INJECTION, SOLUTION INTRAVENOUS at 10:11

## 2024-11-27 RX ADMIN — FENTANYL CITRATE 100 MCG: 50 INJECTION, SOLUTION INTRAMUSCULAR; INTRAVENOUS at 10:11

## 2024-11-27 RX ADMIN — KETOROLAC TROMETHAMINE 30 MG: 30 INJECTION, SOLUTION INTRAMUSCULAR; INTRAVENOUS at 11:11

## 2024-11-27 RX ADMIN — CHLORHEXIDINE GLUCONATE 0.12% ORAL RINSE 10 ML: 1.2 LIQUID ORAL at 09:11

## 2024-11-27 RX ADMIN — SODIUM CHLORIDE, SODIUM LACTATE, POTASSIUM CHLORIDE, AND CALCIUM CHLORIDE: .6; .31; .03; .02 INJECTION, SOLUTION INTRAVENOUS at 10:11

## 2024-11-27 RX ADMIN — FAMOTIDINE 20 MG: 20 TABLET ORAL at 09:11

## 2024-11-27 RX ADMIN — SUGAMMADEX 200 MG: 100 INJECTION, SOLUTION INTRAVENOUS at 11:11

## 2024-11-27 RX ADMIN — PROPOFOL 150 MG: 10 INJECTION, EMULSION INTRAVENOUS at 10:11

## 2024-11-27 RX ADMIN — ACETAMINOPHEN 1000 MG: 500 TABLET ORAL at 09:11

## 2024-11-27 RX ADMIN — ONDANSETRON 4 MG: 2 INJECTION INTRAMUSCULAR; INTRAVENOUS at 11:11

## 2024-11-27 RX ADMIN — LIDOCAINE HYDROCHLORIDE 50 MG: 20 INJECTION INTRAVENOUS at 10:11

## 2024-11-27 NOTE — ANESTHESIA PREPROCEDURE EVALUATION
11/27/2024  Carley Carrington is a 38 y.o., female.      Pre-op Assessment    I have reviewed the Patient Summary Reports.     I have reviewed the Nursing Notes. I have reviewed the NPO Status.      Review of Systems  Anesthesia Hx:  No problems with previous Anesthesia                Hematology/Oncology:  Hematology Normal   Oncology Normal                                   Renal/:  Renal/ Normal                 Hepatic/GI:  Hepatic/GI Normal                    Neurological:  Neurology Normal                                      Endocrine:  Endocrine Normal            Dermatological:  Skin Normal    Psych:  Psychiatric Normal                    Physical Exam  General: Well nourished, Cooperative, Alert and Oriented    Airway:  Mallampati: II / II  Mouth Opening: Normal  TM Distance: Normal  Tongue: Normal  Neck ROM: Normal ROM    Dental:  Intact      Patient Active Problem List   Diagnosis    Sickle cell trait    Genital herpes    Heart murmur    Multiple joint pain    Severe obesity (BMI 35.0-39.9) with comorbidity    Weight gain    Dysmetabolic syndrome X    Hyperlipidemia         Anesthesia Plan  Type of Anesthesia, risks & benefits discussed:    Anesthesia Type: Gen ETT  Intra-op Monitoring Plan: Standard ASA Monitors  Post Op Pain Control Plan: multimodal analgesia  Induction:  IV  Airway Plan: Direct  Informed Consent: Informed consent signed with the Patient and all parties understand the risks and agree with anesthesia plan.  All questions answered.   ASA Score: 2  Day of Surgery Review of History & Physical: H&P Update referred to the surgeon/provider.I have interviewed and examined the patient. I have reviewed the patient's H&P dated: There are no significant changes. H&P completed by Anesthesiologist.    Ready For Surgery From Anesthesia Perspective.     .

## 2024-11-27 NOTE — BRIEF OP NOTE
[facility] - Surgery (Hospital)  Brief Operative Note    Surgery Date: 11/27/2024     Surgeons and Role:     * Jannet Caro MD - Primary    Assistant:  varun de la o    Pre-op Diagnosis:  Encounter for sterilization [Z30.2]    Post-op Diagnosis:  Post-Op Diagnosis Codes:     * Encounter for sterilization [Z30.2]    Procedure(s) (LRB):  SALPINGECTOMY, LAPAROSCOPIC (Bilateral)    Anesthesia: General    Operative Findings: normal appearing tubes; normal ovaries; no evidence of enlarged cyst    Estimated Blood Loss: * No values recorded between 11/27/2024 10:46 AM and 11/27/2024 11:14 AM *5cc         Specimens:   Specimen (24h ago, onward)      Right tube  Left tube              Discharge Note    OUTCOME: Patient tolerated treatment/procedure well without complication and is now ready for discharge.    DISPOSITION: Home or Self Care    FINAL DIAGNOSIS:  Status post bilateral salpingectomy    FOLLOWUP: In clinic, dr caro, 2 wks    DISCHARGE INSTRUCTIONS:    Discharge Procedure Orders   Diet general     Pelvic Rest   Order Comments: X 2 wks --no tampons , intercourse, douching     No dressing needed     Call MD for:  temperature >100.4     Call MD for:  persistent nausea and vomiting     Call MD for:  severe uncontrolled pain     Call MD for:  difficulty breathing, headache or visual disturbances     EKG 12-lead   Standing Status: Future Standing Exp. Date: 11/20/25

## 2024-11-27 NOTE — TRANSFER OF CARE
"Anesthesia Transfer of Care Note    Patient: Carley Carrington    Procedure(s) Performed: Procedure(s) (LRB):  SALPINGECTOMY, LAPAROSCOPIC (Bilateral)    Patient location: PACU    Anesthesia Type: general    Transport from OR: Transported from OR on room air with adequate spontaneous ventilation    Post pain: adequate analgesia    Post assessment: no apparent anesthetic complications    Post vital signs: stable    Level of consciousness: responds to stimulation    Nausea/Vomiting: no nausea/vomiting    Complications: none    Transfer of care protocol was followed      Last vitals: Visit Vitals  BP (!) 141/65 (BP Location: Right arm, Patient Position: Sitting)   Pulse 73   Temp 36.6 °C (97.9 °F) (Temporal)   Resp 18   Ht 5' 10" (1.778 m)   Wt 73 kg (160 lb 15 oz)   LMP 11/18/2024   SpO2 100%   Breastfeeding No   BMI 23.09 kg/m²     "

## 2024-11-27 NOTE — INTERVAL H&P NOTE
The patient has been examined and the H&P has been reviewed:    I concur with the findings and no changes have occurred since H&P was written.  She is ready to proceed with sterilization (salpingectomy) and wants cyst on ovary removed    Surgery risks, benefits and alternative options discussed and understood by patient/family.    Post op course reviewed  Upt negative  Hgb 13.1      There are no hospital problems to display for this patient.

## 2024-11-27 NOTE — ANESTHESIA PROCEDURE NOTES
Intubation    Date/Time: 11/27/2024 10:28 AM    Performed by: Dylan Esparza CRNA  Authorized by: Valdez Haynes MD    Intubation:     Induction:  Intravenous    Intubated:  Postinduction    Mask Ventilation:  Easy mask    Attempts:  1    Attempted By:  Student    Method of Intubation:  Direct    Blade:  Javed 2    Laryngeal View Grade: Grade I - full view of cords      Difficult Airway Encountered?: No      Complications:  None    Airway Device:  Oral endotracheal tube    Airway Device Size:  7.5    Style/Cuff Inflation:  Cuffed (inflated to minimal occlusive pressure)    Tube secured:  22    Secured at:  The lips    Placement Verified By:  Capnometry    Complicating Factors:  None    Findings Post-Intubation:  BS equal bilateral

## 2024-11-27 NOTE — OP NOTE
[facility] - Surgery ([facility])  General Surgery  Operative Note    SUMMARY     Date of Procedure: 11/27/2024     Procedure: Procedure(s) (LRB):  SALPINGECTOMY, LAPAROSCOPIC (Bilateral)       Surgeons and Role:     * Jannet Chavez MD - Primary    Assistant:  varun de la o    Pre-Operative Diagnosis: Encounter for sterilization [Z30.2]    Post-Operative Diagnosis: Post-Op Diagnosis Codes:     * Encounter for sterilization [Z30.2]    Anesthesia: General    Operative Findings (including complications, if any): normal appearing tubes; normal ovaries (no enlarged cyst on either ovary)    Description of Technical Procedures: laparoscopic bilateral salpingectomy    The patient was taken to the Operating Room where GETA was induced and found to be adequate. She was then placed in the dorsal lithotomy position with her legs in the Jayjay stirrups and her arms tucked at her sides.  Her perineum was prepped and draped in the normal, sterile fashion, and a Byrne catheter was placed in her bladder and hung to gravity.  A Zumi intrauterine manipulator was then placed in the usual fashion.  Her abdomen was then prepped and draped in the normal, sterile fashion, and her legs were placed in the low lithotomy position.  Time out was performed.  The periumbilical skin was tented with perforating towel clamps, and a Verress needle was inserted through the umbilicus into the intraperitoneal cavity.  Intraperitoneal placement was confirmed with a water drop test, and pneumoperitoneum was achieved with Carbon Dioxide gas up to a pressure of 15 mmHg.  The verress needle was removed, and a 5 mm skin incision was made in the umbilicus. The laparoscope was placed in the 5 mm trocar and inserted with the optiview technique.    Immediate inspection of underlying organs revealed no damage or injury.  She was then placed in Trendelenburg position.  The pelvic organs were examined, and the findings stated above were noted.  A 8 mm accessory trocar was  placed in the left lower quadrant, and a 5 mm trocar was placed in the right lower quadrant.   The left fallopian tube was then excised working from the fimbriated end to the cornual portion of the tube using the harmonic scalpel.  The left fallopian tube was then removed and sent to pathology for permanent section.  This same procedure was repeated on the right fallopian tube.  All surgical sites were then examined and noted to be hemostatic.    All instruments were removed from the trocars and pneumoperitoneum was allowed to escape.  The patient was taken out of Trendelenburg position, and all trocars were removed.  Hemostasis at all skin sites was achieved with the Bovie cautery.  All skin sites were closed with 4-0 monocryl in a running, subcuticular fashion.  All instruments were removed from the vagina.  Sponge, laparotomy sponge, and needle counts were correct.  She will go to recovery in stable condition.    Significant Surgical Tasks Conducted by the Assistant(s), if Applicable: 1st assist    Estimated Blood Loss (EBL): * No values recorded between 11/27/2024 10:46 AM and 11/27/2024 11:10 AM *5cc           Implants: * No implants in log *    Specimens:   Specimen (24h ago, onward)      None                    Condition: Good    Disposition: PACU - hemodynamically stable.    Attestation: I performed the procedure.

## 2024-11-29 VITALS
HEIGHT: 70 IN | BODY MASS INDEX: 23.04 KG/M2 | SYSTOLIC BLOOD PRESSURE: 138 MMHG | DIASTOLIC BLOOD PRESSURE: 92 MMHG | OXYGEN SATURATION: 100 % | HEART RATE: 77 BPM | WEIGHT: 160.94 LBS | RESPIRATION RATE: 19 BRPM | TEMPERATURE: 97 F

## 2024-12-02 LAB
FINAL PATHOLOGIC DIAGNOSIS: NORMAL
GROSS: NORMAL
Lab: NORMAL

## 2024-12-08 NOTE — ANESTHESIA POSTPROCEDURE EVALUATION
Anesthesia Post Evaluation    Patient: Carley Carrington    Procedure(s) Performed: Procedure(s) (LRB):  SALPINGECTOMY, LAPAROSCOPIC (Bilateral)    Final Anesthesia Type: general      Patient location during evaluation: PACU  Patient participation: Yes- Able to Participate  Level of consciousness: awake and alert, oriented and awake  Post-procedure vital signs: reviewed and stable  Pain management: adequate  Airway patency: patent    PONV status at discharge: No PONV  Anesthetic complications: no      Cardiovascular status: blood pressure returned to baseline  Respiratory status: unassisted  Hydration status: euvolemic  Follow-up not needed.              Vitals Value Taken Time   /92 11/27/24 1200   Temp 36.2 °C (97.2 °F) 11/27/24 1125   Pulse 77 11/27/24 1200   Resp 19 11/27/24 1200   SpO2 100 % 11/27/24 1200         Event Time   Out of Recovery 12:06:22         Pain/Quincy Score: No data recorded

## 2024-12-12 ENCOUNTER — TELEPHONE (OUTPATIENT)
Dept: OBSTETRICS AND GYNECOLOGY | Facility: CLINIC | Age: 39
End: 2024-12-12
Payer: COMMERCIAL

## 2024-12-12 NOTE — TELEPHONE ENCOUNTER
Attempted to contact pt, no answer, lvm to return call to clinic.  ----- Message from Aidee sent at 12/12/2024  7:45 AM CST -----  Contact: Carley  .Type:  Patient Returning Call    Who Called:Carley   Who Left Message for Patient:nurse   Does the patient know what this is regarding?:unknown   Would the patient rather a call back or a response via [facility]? Call   Best Call Back Number:.[phone]   Additional Information: Pt requesting a call back

## 2024-12-12 NOTE — TELEPHONE ENCOUNTER
----- Message from [employer] sent at 12/12/2024  8:18 AM CST -----  Contact: self [phone]  Type:  Patient Returning Call    Who Called:Carley   Who Left Message for Patient: ISRA  Does the patient know what this is regarding?:yes   Would the patient rather a call back or a response via [facility]? Call back   Best Call Back Number:[phone]  Additional Information:

## 2024-12-12 NOTE — TELEPHONE ENCOUNTER
Attempted to contact pt, no answer, lvm to return call to clinic.    ----- Message from Akanksha sent at 12/11/2024  4:09 PM CST -----  Contact: Carley Stephens is calling in regards to upcoming appt. Reports she's supposed to do a pelvic exam for post op however, it's virtual. Asking if she can see a np in person instead. Please return call to pt at [phone].

## 2024-12-12 NOTE — TELEPHONE ENCOUNTER
Called patient and she stated she is not sure why her post-op was scheduled as virtual and stated she is returning Logan's call.

## 2024-12-16 ENCOUNTER — LAB VISIT (OUTPATIENT)
Dept: LAB | Facility: HOSPITAL | Age: 39
End: 2024-12-16
Attending: PHYSICIAN ASSISTANT
Payer: COMMERCIAL

## 2024-12-16 ENCOUNTER — OFFICE VISIT (OUTPATIENT)
Dept: OBSTETRICS AND GYNECOLOGY | Facility: CLINIC | Age: 39
End: 2024-12-16
Payer: COMMERCIAL

## 2024-12-16 VITALS
BODY MASS INDEX: 22.85 KG/M2 | DIASTOLIC BLOOD PRESSURE: 74 MMHG | WEIGHT: 159.63 LBS | HEIGHT: 70 IN | SYSTOLIC BLOOD PRESSURE: 126 MMHG

## 2024-12-16 DIAGNOSIS — Z90.79 STATUS POST BILATERAL SALPINGECTOMY: Primary | ICD-10-CM

## 2024-12-16 DIAGNOSIS — Z11.3 SCREENING EXAMINATION FOR STI: ICD-10-CM

## 2024-12-16 LAB — HIV 1+2 AB+HIV1 P24 AG SERPL QL IA: NORMAL

## 2024-12-16 PROCEDURE — 3074F SYST BP LT 130 MM HG: CPT | Mod: CPTII,S$GLB,, | Performed by: PHYSICIAN ASSISTANT

## 2024-12-16 PROCEDURE — 36415 COLL VENOUS BLD VENIPUNCTURE: CPT | Performed by: PHYSICIAN ASSISTANT

## 2024-12-16 PROCEDURE — 3078F DIAST BP <80 MM HG: CPT | Mod: CPTII,S$GLB,, | Performed by: PHYSICIAN ASSISTANT

## 2024-12-16 PROCEDURE — 87389 HIV-1 AG W/HIV-1&-2 AB AG IA: CPT | Performed by: PHYSICIAN ASSISTANT

## 2024-12-16 PROCEDURE — 99024 POSTOP FOLLOW-UP VISIT: CPT | Mod: S$GLB,,, | Performed by: PHYSICIAN ASSISTANT

## 2024-12-16 PROCEDURE — 3044F HG A1C LEVEL LT 7.0%: CPT | Mod: CPTII,S$GLB,, | Performed by: PHYSICIAN ASSISTANT

## 2024-12-16 PROCEDURE — 1159F MED LIST DOCD IN RCRD: CPT | Mod: CPTII,S$GLB,, | Performed by: PHYSICIAN ASSISTANT

## 2024-12-16 PROCEDURE — 99999 PR PBB SHADOW E&M-EST. PATIENT-LVL III: CPT | Mod: PBBFAC,,, | Performed by: PHYSICIAN ASSISTANT

## 2024-12-16 NOTE — PROGRESS NOTES
OBGYN Post-op Clinic  History and Physical    Patient Name: Carley Carrington  YOB: 1985 (39 y.o.)  MRN: [id number]  Today's Date: 2024    Referring Md:   No referring provider defined for this encounter.    SUBJECTIVE:     Chief Complaint: Post-op  Dressing Removal    History of Present Illness:  Carley Carrington is a 39 y.o. female  who presents to the clinic today for post-op evaluation. S/p lap salp on 24. Reports no pain and is feeling well. Had some vaginal bleeding right after surgery but this has since resolved. Did have constipation initially post-op but this has resolved. Denies fevers, nausea, or other concerns. Requests screening lab work.       Review of patient's allergies indicates:  No Known Allergies    Past Medical History:   Diagnosis Date    Benign tumor of heart     Enlarged heart      Past Surgical History:   Procedure Laterality Date    LAPAROSCOPIC SALPINGECTOMY Bilateral 2024    Procedure: SALPINGECTOMY, LAPAROSCOPIC;  Surgeon: Jannet Chavez MD;  Location: [facility] OR;  Service: OB/GYN;  Laterality: Bilateral;    TONSILLECTOMY N/A 10/18/2024    Procedure: TONSILLECTOMY;  Surgeon: Juancarlos Bee MD;  Location: [facility] OR;  Service: ENT;  Laterality: N/A;     Family History   Problem Relation Name Age of Onset    Diabetes Mother      Hypertension Mother      Heart disease Father      Lupus Sister      Diabetes Maternal Grandmother      Lupus Maternal Grandmother      Hypertension Maternal Grandmother      Lupus Maternal Aunt       Social History     Tobacco Use    Smoking status: Never    Smokeless tobacco: Never   Substance Use Topics    Alcohol use: Never    Drug use: Never        Review of Systems:  Review of Systems   Constitutional:  Negative for chills and fever.   HENT:  Negative for congestion and sore throat.    Respiratory:  Negative for cough and shortness of breath.    Cardiovascular:  Negative for chest pain and leg swelling.   Gastrointestinal:   "Negative for abdominal pain, nausea and vomiting.   Genitourinary:  Negative for dysuria.   Musculoskeletal:  Negative for myalgias.   Skin:  Negative for rash.   Neurological:  Negative for weakness and headaches.       OBJECTIVE:     Vital Signs (Most Recent)  /74   Ht 5' 10" (1.778 m)   Wt 72.4 kg (159 lb 9.8 oz)   LMP 11/17/2024 (Approximate)   BMI 22.90 kg/m²     Physical Exam  Vitals reviewed.   Constitutional:       General: She is not in acute distress.     Appearance: Normal appearance. She is not ill-appearing or toxic-appearing.   HENT:      Head: Normocephalic and atraumatic.   Eyes:      Extraocular Movements: Extraocular movements intact.      Conjunctiva/sclera: Conjunctivae normal.   Pulmonary:      Effort: Pulmonary effort is normal. No respiratory distress.   Abdominal:      Palpations: Abdomen is soft.      Tenderness: There is no abdominal tenderness.      Comments: Three well healing incisions from trocar sites present on abdomen. No erythema, warmth, drainage, or swelling.    Musculoskeletal:         General: Normal range of motion.      Cervical back: Normal range of motion.   Skin:     General: Skin is warm and dry.      Capillary Refill: Capillary refill takes less than 2 seconds.   Neurological:      General: No focal deficit present.      Mental Status: She is alert and oriented to person, place, and time.   Psychiatric:         Mood and Affect: Mood normal.         Behavior: Behavior normal.         Thought Content: Thought content normal.         Judgment: Judgment normal.             ASSESSMENT/PLAN:     Carley Carrington is a 39 y.o. female was seen today for post-op evaluation. Doing very well, meeting milestones as anticipated.   I insured the patient has a scheduled post-op visit. I reviewed all restrictions and limitations with the patient including lifting/activity restrictions, pelvic rest, and showering daily (no tub baths/soaking in water of any kind) with antibacterial " soap. Instructed patient to call clinic with any concerning issues or symptoms. Patient verbalized understanding.     Carley was seen today for post-op evaluation.    Diagnoses and all orders for this visit:    Status post bilateral salpingectomy    Screening examination for STI  -     HIV 1/2 Ag/Ab (4th Gen); Future            LINDA Lopez, PA-C  OBGYN - Surgery  [facility]

## 2025-01-28 ENCOUNTER — LAB VISIT (OUTPATIENT)
Dept: LAB | Facility: HOSPITAL | Age: 40
End: 2025-01-28
Attending: NURSE PRACTITIONER
Payer: COMMERCIAL

## 2025-01-28 ENCOUNTER — OFFICE VISIT (OUTPATIENT)
Dept: OBSTETRICS AND GYNECOLOGY | Facility: CLINIC | Age: 40
End: 2025-01-28
Payer: COMMERCIAL

## 2025-01-28 VITALS
SYSTOLIC BLOOD PRESSURE: 118 MMHG | WEIGHT: 160.25 LBS | HEIGHT: 70 IN | DIASTOLIC BLOOD PRESSURE: 82 MMHG | BODY MASS INDEX: 22.94 KG/M2

## 2025-01-28 DIAGNOSIS — Z11.3 SCREEN FOR STD (SEXUALLY TRANSMITTED DISEASE): ICD-10-CM

## 2025-01-28 DIAGNOSIS — N89.8 VAGINAL ODOR: Primary | ICD-10-CM

## 2025-01-28 DIAGNOSIS — N76.0 RECURRENT VAGINITIS: ICD-10-CM

## 2025-01-28 PROCEDURE — 80074 ACUTE HEPATITIS PANEL: CPT | Performed by: NURSE PRACTITIONER

## 2025-01-28 PROCEDURE — 3008F BODY MASS INDEX DOCD: CPT | Mod: CPTII,S$GLB,, | Performed by: NURSE PRACTITIONER

## 2025-01-28 PROCEDURE — 87591 N.GONORRHOEAE DNA AMP PROB: CPT | Performed by: NURSE PRACTITIONER

## 2025-01-28 PROCEDURE — 81515 NFCT DS BV&VAGINITIS DNA ALG: CPT | Performed by: NURSE PRACTITIONER

## 2025-01-28 PROCEDURE — 1159F MED LIST DOCD IN RCRD: CPT | Mod: CPTII,S$GLB,, | Performed by: NURSE PRACTITIONER

## 2025-01-28 PROCEDURE — 36415 COLL VENOUS BLD VENIPUNCTURE: CPT | Performed by: NURSE PRACTITIONER

## 2025-01-28 PROCEDURE — 99214 OFFICE O/P EST MOD 30 MIN: CPT | Mod: S$GLB,,, | Performed by: NURSE PRACTITIONER

## 2025-01-28 PROCEDURE — 3074F SYST BP LT 130 MM HG: CPT | Mod: CPTII,S$GLB,, | Performed by: NURSE PRACTITIONER

## 2025-01-28 PROCEDURE — 99999 PR PBB SHADOW E&M-EST. PATIENT-LVL III: CPT | Mod: PBBFAC,,, | Performed by: NURSE PRACTITIONER

## 2025-01-28 PROCEDURE — 3079F DIAST BP 80-89 MM HG: CPT | Mod: CPTII,S$GLB,, | Performed by: NURSE PRACTITIONER

## 2025-01-28 PROCEDURE — 87389 HIV-1 AG W/HIV-1&-2 AB AG IA: CPT | Performed by: NURSE PRACTITIONER

## 2025-01-28 PROCEDURE — 1160F RVW MEDS BY RX/DR IN RCRD: CPT | Mod: CPTII,S$GLB,, | Performed by: NURSE PRACTITIONER

## 2025-01-28 PROCEDURE — 86593 SYPHILIS TEST NON-TREP QUANT: CPT | Performed by: NURSE PRACTITIONER

## 2025-01-28 RX ORDER — FLUCONAZOLE 150 MG/1
TABLET ORAL
Qty: 3 TABLET | Refills: 0 | Status: SHIPPED | OUTPATIENT
Start: 2025-01-28

## 2025-01-28 RX ORDER — METRONIDAZOLE 500 MG/1
500 TABLET ORAL EVERY 12 HOURS
Qty: 14 TABLET | Refills: 0 | Status: SHIPPED | OUTPATIENT
Start: 2025-01-28 | End: 2025-02-04

## 2025-01-28 RX ORDER — METRONIDAZOLE 7.5 MG/G
GEL VAGINAL
Qty: 70 G | Refills: 5 | Status: SHIPPED | OUTPATIENT
Start: 2025-01-28

## 2025-01-28 NOTE — PROGRESS NOTES
Subjective:       Patient ID: Carley Carrington is a 39 y.o. female.    Chief Complaint:  vaginal odor    Patient's last menstrual period was 2025 (exact date).  History of Present Illness  Complains of vaginal odor and itching   States that each time she has intercourse with her  she ends up with vaginal odor (fishy)  Desires recurrent bv treatment and std screening     OB History    Para Term  AB Living   5 4 4   1 4   SAB IAB Ectopic Multiple Live Births   1       4      # Outcome Date GA Lbr Sanket/2nd Weight Sex Type Anes PTL Lv   5 SAB               Complications: Miscarriage   4 Term      Vag-Spont   NAVEEN   3 Term      Vag-Spont   NAVEEN   2 Term      Vag-Spont   NAVEEN   1 Term      Vag-Spont   NAVEEN       Review of Systems  Review of Systems        Objective:    Physical Exam      Assessment:     1. Vaginal odor    2. Screen for STD (sexually transmitted disease)    3. Recurrent vaginitis              Plan:   Carley was seen today for vaginal odor.    Diagnoses and all orders for this visit:    Vaginal odor  -     metroNIDAZOLE (FLAGYL) 500 MG tablet; Take 1 tablet (500 mg total) by mouth every 12 (twelve) hours. for 7 days  -     fluconazole (DIFLUCAN) 150 MG Tab; Take one tablet today and may repeat every three days up to 3 doses  -     Vaginosis Screen by DNA Probe; Future    Screen for STD (sexually transmitted disease)  -     HIV 1/2 Ag/Ab (4th Gen); Future  -     Treponema Pallidium Antibodies IgG, IgM; Future  -     Hepatitis Panel, Acute; Future  -     C. trachomatis/N. gonorrhoeae by AMP DNA [facility]; Vagina; Future    Recurrent vaginitis  -     metroNIDAZOLE (METROGEL) 0.75 % (37.5mg/5 gram) vaginal gel; Insert 1 applicator vaginally twice a week    Return to clinic PRN

## 2025-01-30 ENCOUNTER — PATIENT MESSAGE (OUTPATIENT)
Dept: OBSTETRICS AND GYNECOLOGY | Facility: CLINIC | Age: 40
End: 2025-01-30
Payer: COMMERCIAL

## 2025-01-31 LAB
BACTERIAL VAGINOSIS DNA: DETECTED
C TRACH DNA SPEC QL NAA+PROBE: NOT DETECTED
CANDIDA GLABRATA/KRUSEI: NOT DETECTED
CANDIDA RRNA VAG QL PROBE: NOT DETECTED
N GONORRHOEA DNA SPEC QL NAA+PROBE: NOT DETECTED
TRICHOMONAS VAGINALIS: NOT DETECTED

## 2025-04-08 PROBLEM — E66.01 SEVERE OBESITY (BMI 35.0-39.9) WITH COMORBIDITY: Status: RESOLVED | Noted: 2023-05-10 | Resolved: 2025-04-08

## 2025-04-09 NOTE — TELEPHONE ENCOUNTER
Pt requested to have in person appt, instead of virtual, pt decline coming to [facility], pt schedule to see PA today at [facility], pt voiced understanding.    no

## 2025-06-23 ENCOUNTER — OFFICE VISIT (OUTPATIENT)
Dept: OBSTETRICS AND GYNECOLOGY | Facility: CLINIC | Age: 40
End: 2025-06-23
Payer: COMMERCIAL

## 2025-06-23 ENCOUNTER — TELEPHONE (OUTPATIENT)
Dept: OBSTETRICS AND GYNECOLOGY | Facility: CLINIC | Age: 40
End: 2025-06-23
Payer: COMMERCIAL

## 2025-06-23 ENCOUNTER — LAB VISIT (OUTPATIENT)
Dept: LAB | Facility: HOSPITAL | Age: 40
End: 2025-06-23
Payer: COMMERCIAL

## 2025-06-23 VITALS
DIASTOLIC BLOOD PRESSURE: 82 MMHG | HEIGHT: 69 IN | BODY MASS INDEX: 25.47 KG/M2 | SYSTOLIC BLOOD PRESSURE: 122 MMHG | WEIGHT: 171.94 LBS

## 2025-06-23 DIAGNOSIS — N76.0 RECURRENT VAGINITIS: Primary | ICD-10-CM

## 2025-06-23 DIAGNOSIS — Z11.3 SCREEN FOR STD (SEXUALLY TRANSMITTED DISEASE): ICD-10-CM

## 2025-06-23 LAB
HAV IGM SERPL QL IA: NORMAL
HBV CORE IGM SERPL QL IA: NORMAL
HBV SURFACE AG SERPL QL IA: NORMAL
HCV AB SERPL QL IA: NORMAL
HIV 1+2 AB+HIV1 P24 AG SERPL QL IA: NORMAL
T PALLIDUM IGG+IGM SER QL: NORMAL

## 2025-06-23 PROCEDURE — 1160F RVW MEDS BY RX/DR IN RCRD: CPT | Mod: CPTII,S$GLB,,

## 2025-06-23 PROCEDURE — 99999 PR PBB SHADOW E&M-EST. PATIENT-LVL III: CPT | Mod: PBBFAC,,,

## 2025-06-23 PROCEDURE — 99213 OFFICE O/P EST LOW 20 MIN: CPT | Mod: S$GLB,,,

## 2025-06-23 PROCEDURE — 86593 SYPHILIS TEST NON-TREP QUANT: CPT

## 2025-06-23 PROCEDURE — 81515 NFCT DS BV&VAGINITIS DNA ALG: CPT

## 2025-06-23 PROCEDURE — 87591 N.GONORRHOEAE DNA AMP PROB: CPT

## 2025-06-23 PROCEDURE — 3079F DIAST BP 80-89 MM HG: CPT | Mod: CPTII,S$GLB,,

## 2025-06-23 PROCEDURE — 1159F MED LIST DOCD IN RCRD: CPT | Mod: CPTII,S$GLB,,

## 2025-06-23 PROCEDURE — 87389 HIV-1 AG W/HIV-1&-2 AB AG IA: CPT

## 2025-06-23 PROCEDURE — 3008F BODY MASS INDEX DOCD: CPT | Mod: CPTII,S$GLB,,

## 2025-06-23 PROCEDURE — 80074 ACUTE HEPATITIS PANEL: CPT

## 2025-06-23 PROCEDURE — 3044F HG A1C LEVEL LT 7.0%: CPT | Mod: CPTII,S$GLB,,

## 2025-06-23 PROCEDURE — 36415 COLL VENOUS BLD VENIPUNCTURE: CPT

## 2025-06-23 PROCEDURE — 3074F SYST BP LT 130 MM HG: CPT | Mod: CPTII,S$GLB,,

## 2025-06-23 RX ORDER — METRONIDAZOLE 7.5 MG/G
GEL VAGINAL
COMMUNITY
Start: 2025-04-08

## 2025-06-23 NOTE — TELEPHONE ENCOUNTER
Copied from CRM #[id number]. Topic: Appointments - Appointment Access  >> Jun 23, 2025  8:06 AM Izzy wrote:  Type:  Same Day Appointment Request    Caller is requesting a same day appointment.  Caller declined first available appointment listed below.    Name of Caller:Carley Carrington   When is the first available appointment?tomorrow 6/24/2025  Symptoms:vaginal discomfort , std panel. She wants to keep the appointment for today at 11:45 am as a problem appointment if is possible  Best Call Back Number:[phone]  Thanks!

## 2025-06-23 NOTE — TELEPHONE ENCOUNTER
Spoke to patient and she wanted to change her appt to std checkup only. No wwe. Patient is not due until after 9/25/24.

## 2025-06-23 NOTE — PROGRESS NOTES
Subjective:       Patient ID: Carley Carrington is a 39 y.o. female.    Chief Complaint:  STD CHECK      History of Present Illness  HPI  Vaginal Discharge and Irritation  Patient presents for vaginal discharge check. Sexual history reviewed with the patient. STD exposure: denies knowledge of risky exposure.  Previous history of STD:  none. Current symptoms include vaginal discharge: copious, thick, curd-like, and odorless.  Contraception: tubal ligation.        GYN & OB History  Patient's last menstrual period was 2025.   Date of Last Pap: 10/2/2024    OB History    Para Term  AB Living   5 4 4  1 4   SAB IAB Ectopic Multiple Live Births   1    4      # Outcome Date GA Lbr Sanket/2nd Weight Sex Type Anes PTL Lv   5 SAB               Complications: Miscarriage   4 Term      Vag-Spont   NAVEEN   3 Term      Vag-Spont   NAVEEN   2 Term      Vag-Spont   NAVEEN   1 Term      Vag-Spont   NAVEEN       Review of Systems  Review of Systems   Constitutional: Negative.    HENT: Negative.     Eyes: Negative.    Respiratory: Negative.     Cardiovascular: Negative.    Gastrointestinal: Negative.    Genitourinary:  Positive for vaginal discharge.   Musculoskeletal: Negative.    Integumentary:  Negative.   Neurological: Negative.    Hematological: Negative.    Psychiatric/Behavioral: Negative.     All other systems reviewed and are negative.  Breast: negative.            Objective:      Physical Exam:   Constitutional: She is oriented to person, place, and time. She appears well-developed and well-nourished. No distress.    HENT:   Head: Normocephalic and atraumatic.    Eyes: Pupils are equal, round, and reactive to light. Conjunctivae and EOM are normal.     Cardiovascular:  Normal rate.             Pulmonary/Chest: Effort normal.        Abdominal: Soft. She exhibits no distension. There is no abdominal tenderness. There is no rebound and no guarding. Hernia confirmed negative in the right inguinal area and confirmed negative in  the left inguinal area.     Genitourinary:    Inguinal canal, vagina, uterus, right adnexa and left adnexa normal.   Rectum:      No external hemorrhoid.      Pelvic exam was performed with patient supine.   The external female genitalia was normal.   No external genitalia lesions identified,Genitalia hair distrobution normal .     Labial bartholins normal.There is no rash, tenderness, lesion or injury on the right labia. There is no rash, tenderness, lesion or injury on the left labia. Cervix is normal. Right adnexum displays no mass, no tenderness and no fullness. Left adnexum displays no mass, no tenderness and no fullness. No erythema, vaginal discharge, tenderness or bleeding in the vagina.    No foreign body in the vagina.      No signs of injury in the vagina.   Vagina was moist.Cervix exhibits no motion tenderness, no lesion, no friability, no tenderness and no polyp. Uerus contour normal  Uterus is not enlarged and not tender. Normal urethral meatus.Urethral Meatus exhibits: no urethral lesionUrethra findings: no urethral mass, no tenderness, no urethral scarring and no prolapsedBladder findings: no bladder distention and no bladder tenderness          Musculoskeletal: Normal range of motion and moves all extremeties.      Lymphadenopathy: No inguinal adenopathy noted on the right or left side.    Neurological: She is alert and oriented to person, place, and time.    Skin: Skin is warm and dry. No rash noted. She is not diaphoretic. No erythema. No pallor.    Psychiatric: She has a normal mood and affect. Her behavior is normal. Judgment and thought content normal.             Assessment:        1. Recurrent vaginitis    2. Screen for STD (sexually transmitted disease)               Plan:   Vaginitis prevention discussed:   a. avoiding feminine products such as deoderant soaps, body wash, bubble bath, douches, scented toilet paper, deoderant tampons or pads, feminine wipes, chronic pad use, etc.   b.  avoiding other vulvovaginal irritants such as long hot baths, humidity, tight, synthetic clothing, chlorine and sitting around in wet bathing suits   c. wearing cotton underwear, avoiding thong underwear and no underwear to bed   d. taking showers instead of baths and use a hair dryer on cool setting afterwards to dry   e. wearing cotton to exercise and shower immediately after exercise and change clothes   f. using polyurethane condoms without spermicide if sexually active and symptoms are triggered by intercourse      I also recommend taking a daily Probiotic ([employer]ew [employer] Woman's Daily Probiotic) and Vit D supplement.   You can use a vaginal Boric Acid suppository as needed.     Diagnosis and orders this visit:  Recurrent vaginitis  -     Vaginosis Screen by DNA Probe    Screen for STD (sexually transmitted disease)  -     C. trachomatis/N. gonorrhoeae by AMP DNA  -     HIV 1/2 Ag/Ab (4th Gen); Future; Expected date: 06/23/2025  -     Hepatitis Panel, Acute; Future; Expected date: 06/23/2025  -     Treponema Pallidium Antibodies IgG, IgM; Future; Expected date: 06/23/2025         Janelle Anaya, NP

## 2025-06-25 LAB
BACTERIAL VAGINOSIS DNA (OHS): NOT DETECTED
C TRACH DNA SPEC QL NAA+PROBE: NOT DETECTED
CANDIDA GLABRATA/KRUSEI DNA (OHS): NOT DETECTED
CANDIDA SPECIES DNA (OHS): NOT DETECTED
CTGC SOURCE (OHS) ORD-325: NORMAL
N GONORRHOEA DNA UR QL NAA+PROBE: NOT DETECTED
TRICHOMONAS VAGINALIS DNA (OHS): NOT DETECTED

## 2025-06-26 ENCOUNTER — RESULTS FOLLOW-UP (OUTPATIENT)
Dept: OBSTETRICS AND GYNECOLOGY | Facility: CLINIC | Age: 40
End: 2025-06-26

## 2025-07-07 PROBLEM — R07.9 CHEST PAIN AT REST: Status: ACTIVE | Noted: 2025-07-07

## 2025-07-07 PROBLEM — R22.43 LOCALIZED SWELLING OF BOTH LOWER LEGS: Status: ACTIVE | Noted: 2025-07-07

## 2025-07-07 PROBLEM — M21.611 BILATERAL BUNIONS: Status: ACTIVE | Noted: 2025-07-07

## 2025-07-07 PROBLEM — M21.41 PES PLANUS OF BOTH FEET: Status: ACTIVE | Noted: 2025-07-07

## 2025-07-07 PROBLEM — M21.612 BILATERAL BUNIONS: Status: ACTIVE | Noted: 2025-07-07

## 2025-07-07 PROBLEM — M21.42 PES PLANUS OF BOTH FEET: Status: ACTIVE | Noted: 2025-07-07

## 2025-07-18 ENCOUNTER — OFFICE VISIT (OUTPATIENT)
Dept: PHYSICAL MEDICINE AND REHAB | Facility: CLINIC | Age: 40
End: 2025-07-18
Payer: COMMERCIAL

## 2025-07-18 VITALS — WEIGHT: 178.56 LBS | RESPIRATION RATE: 13 BRPM | HEIGHT: 69 IN | BODY MASS INDEX: 26.45 KG/M2

## 2025-07-18 DIAGNOSIS — G56.03 BILATERAL CARPAL TUNNEL SYNDROME: ICD-10-CM

## 2025-07-18 DIAGNOSIS — G57.03 PIRIFORMIS SYNDROME OF BOTH SIDES: Primary | ICD-10-CM

## 2025-07-18 PROCEDURE — 99999 PR PBB SHADOW E&M-EST. PATIENT-LVL III: CPT | Mod: PBBFAC,,, | Performed by: PHYSICAL MEDICINE & REHABILITATION

## 2025-07-18 NOTE — PROGRESS NOTES
[facility]  Department of Physiatry-EMG  [facility]   [address] [place] [address]         Full Name: Carley Carrington YOB: 1985  Patient ID: [id number]      Visit Date: 7/18/2025 10:21 AM  Age: 39 Years  Examining Physician: Katerine Carbajal M.D.  Referring Physician: Dr Coles  Patient History: upper and lower  Chief Complaint   Patient presents with    Numbness       HPI: This is a 39 y.o.  female being seen in clinic today for evaluation of chronic numbness/tingling and achy pain in her hands and feet.  She has increased discomfort at rest or with activity. Position change can provide some relief. She has a family history of SLE and other medical issues-especially CV.  History obtained from patient    Past family, medical, social, and surgical history reviewed in chart    Review of Systems:     General- denies lethargy, weight change, fever, chills  Head/neck- denies swallowing difficulties  ENT- denies hearing changes  Cardiovascular-denies chest pain  Pulmonary- denies shortness of breath  GI- denies constipation or bowel incontinence  - denies bladder incontinence  Skin- denies wounds or rashes  Musculoskeletal- denies weakness, + pain  Neurologic- + numbness and tingling  Psychiatric- denies depressive or psychotic features, denies anxiety  Lymphatic-+ swelling  Endocrine- denies hypoglycemic symptoms/DM history  All other pertinent systems negative     Physical Examination:  General: Well developed, well nourished female, NAD  HEENT:NCAT EOMI bilaterally   Pulmonary:Normal respirations    Spinal Examination: CERVICAL  Active ROM is within normal limits.  Inspection: No deformity of spinal alignment.    Spinal Examination: LUMBAR or THORACIC  Active ROM is within normal limits.  Inspection: No deformity of spinal alignment.    Slr neg  Very ttp at left piriformis with reproduction, ttp at right piriformis, mild ttp at left si joints, tight  and ttp at quadratus lumborum    Musculoskeletal Tests:  Phalen: neg  Elbow compression (ulnar): neg  Tinels at wrist: neg    Bilateral Upper and Lower Extremities:  Pulses are 2+ at radial bilaterally.  Shoulder/Elbow/Wrist/Hand ROM wnl  Hip/Knee/Ankle ROM wnl  Bilateral Extremities show normal capillary refill.  No signs of cyanosis, rubor, edema, skin changes, or dysvascular changes of appendages.  Nails appear intact.    Neurological Exam:  Cranial Nerves:  II-XII grossly intact    Manual Muscle Testing: (Motor 5=normal)  5/5 strength bilateral upper/lower extremities    No focal atrophy is noted of either upper or lower extremity.    Bilateral Reflexes:  Garcia's response is absent bilaterally.  No clonus at knee or ankle.    Sensation: tested to light touch  - intact in all four limbs.    Gait: Narrow base and good arm swing.      Entire procedure explained to patient prior to proceeding.  Verbal consent obtained      Sensory NCS      Nerve / Sites Rec. Site Onset Lat Peak Lat Amp Distance Onset Dif Peak Diff Onset Rod     ms ms µV mm ms ms m/s   R Median - Dig II (Antidromic)      Wrist Index 3.3 4.0 38.3 140 3.3 4.0 42.7   R Ulnar - Dig V (Antidromic)      Wrist Dig V 2.2 3.0 48.7 140 2.2 3.0 62.5   R Radial - Superficial (Antidromic)      Forearm Wrist 1.8 2.7 27.6 100 1.8 2.7 54.9   L Median - Dig II (Antidromic)      Wrist Index 3.0 3.6 22.4 140 3.0 3.6 47.2   L Ulnar - Dig V (Antidromic)      Wrist Dig V 2.5 3.2 34.0 140 2.5 3.2 56.0   L Radial - Superficial (Antidromic)      Forearm Wrist 2.0 2.7 14.7 100 2.0 2.7 49.2   L Sural - (Antidromic)      Calf Ankle 2.8 3.6 7.0 140 2.8 3.6 50.7   L Superficial peroneal - (Antidromic)      Lat leg Ankle 2.5 3.4 4.0 140 2.5 3.4 56.5       Combined Sensory Index      Nerve / Sites Rec. Site Peak Lat NP Amp PP Amp Segments Peak Diff Comment     ms µV µV  ms    L Median - CSI      Median Thumb 2.92 47.0 35.4         Radial Thumb 2.50 21.8 20.9 Median - Radial 0.42        Median Ring 3.80 26.3 40.2         Ulnar Ring 3.44 22.9 36.6 Median - Ulnar 0.36       Median palm Wrist 2.29 59.7 67.2         Ulnar palm Wrist 1.93 22.9 16.9 Median palm - Ulnar palm 0.36       CSI     CSI 1.15        Motor NCS      Nerve / Sites Muscle Latency Amplitude Segments Dist. Lat Diff Velocity     ms mV  mm ms m/s   R Median - APB      Wrist APB 3.85 12.5 Wrist - APB 80        Elbow APB 8.27 12.0 Elbow - Wrist 260 4.42 58.9   R Ulnar - ADM      Wrist ADM 2.98 8.6 Wrist - ADM 80        B.Elbow ADM 6.63 8.2 B.Elbow - Wrist 250 3.65 68.6      A.Elbow ADM 8.21 8.7 A.Elbow - B.Elbow 100 1.58 63.2   L Median - APB      Wrist APB 3.10 11.6 Wrist - APB 80        Elbow APB 7.48 8.5 Elbow - Wrist 230 4.37 52.6   L Ulnar - ADM      Wrist ADM 2.75 13.5 Wrist - ADM 80        B.Elbow ADM 6.63 11.9 B.Elbow - Wrist 240 3.87 61.9      A.Elbow ADM 8.48 12.1 A.Elbow - B.Elbow 120 1.85 64.7   L Peroneal - EDB      Ankle EDB 3.85 6.2 Ankle - EDB 80        B. Fib Head EDB 10.92 5.3 B. Fib Head - Ankle 330 7.06 46.7      A. Fib Head EDB 12.08 5.2 A. Fib Head - B. Fib Head 60 1.17 51.4   L Tibial - AH      Ankle AH 5.23 5.7 Ankle - AH 80        Knee AH 15.15 4.3 Knee - Ankle 440 9.92 44.4       Needle EMG           EMG Summary Table    Spontaneous MUP Recruitment   Muscle IA Fib PSW Fasc H.F. Amp Dur. PPP Pattern   L. Rectus femoris N None None None None N N N N   L. Extensor digitorum brevis N None None None None N N N N   L. Abductor hallucis N None None None None N N N N                                                     INTERPRETATION  -Bilateral median motor nerve conduction study showed normal latency, amplitude, and conduction velocity  -Bilateral median sensory nerve conduction study showed normal peak latency and amplitude  -Bilateral ulnar motor nerve conduction study showed normal latency, amplitude, and conduction velocity  -Bilateral ulnar sensory nerve conduction study showed normal peak latency and  amplitude  -Bilateral radial sensory nerve conduction study showed normal peak latency and amplitude  -Left combined sensory index was significant  -Left superficial peroneal sensory nerve conduction study showed normal peak latency and amplitude  -Left sural sensory nerve conduction study showed normal peak latency and amplitude  -Left peroneal motor nerve conduction study showed normal latency, amplitude, and conduction velocity  -Left tibial motor nerve conduction study showed normal latency, amplitude, and conduction velocity  -Needle EMG examination performed to above mentioned muscles       IMPRESSION  1 ABNORMAL study  2. 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  There is no evidence of an L2-S1 radiculopathy  3. There is clinical evidence of piriformis syndrome-worse on the left    PLAN  Discussed in detail for greater than 30 minutes about diagnosis and treatment plan    1. Follow up with referring provider: Dr. Annie Coles  2. Handouts on CTS and piriformis exercises provided. Rec referral to PT/OT.  Cont fu with cardio and rheum for underlying medical issues   3. This study is good for one year. If symptoms worsen or do not improve, please re-consult.    Katerine Carbajal M.D.  Physical Medicine and Rehab

## 2025-08-01 PROBLEM — E53.8 VITAMIN B12 DEFICIENCY: Status: ACTIVE | Noted: 2025-08-01

## 2025-08-18 PROBLEM — R07.9 CHEST PAIN AT REST: Status: RESOLVED | Noted: 2025-07-07 | Resolved: 2025-08-18

## (undated) DEVICE — PACK BASIC SETUP SC BR

## (undated) DEVICE — SYR IRRIGATION BULB STER 60ML

## (undated) DEVICE — MANIFOLD 4 PORT

## (undated) DEVICE — NDL PNEUMO INSUFFLATI 120MM

## (undated) DEVICE — KIT ANTIFOG W/SPONG & FLUID

## (undated) DEVICE — SYR PLASTIPAK LL 3ML

## (undated) DEVICE — CONTAINER SPECIMEN OR STER 4OZ

## (undated) DEVICE — TRAY CATH 1-LYR URIMTR 16FR

## (undated) DEVICE — SOL NORMAL USPCA 0.9%

## (undated) DEVICE — SPONGE COTTON TRAY 4X4IN

## (undated) DEVICE — SOL ELECTROLUBE ANTI-STIC

## (undated) DEVICE — DRAPE THREE-QTR REINF 53X77IN

## (undated) DEVICE — TUBING SUCTION STRAIGHT .25X20

## (undated) DEVICE — ADHESIVE DERMABOND ADVANCED

## (undated) DEVICE — IRRIGATOR ENDOSCOPY DISP.

## (undated) DEVICE — CANNULA ENDOPATH XCEL 5X100MM

## (undated) DEVICE — SOL NACL 0.9% IV INJ 1000ML

## (undated) DEVICE — KIT TURNOVER

## (undated) DEVICE — SHEARS HARMONIC 5CM 36CM

## (undated) DEVICE — GOWN POLY REINF BRTH SLV XL

## (undated) DEVICE — KIT SUCTION CATH 14FR

## (undated) DEVICE — SUT VICRYL 4-0 ANTIBACT

## (undated) DEVICE — COVER LIGHT HANDLE 80/CA

## (undated) DEVICE — DRAPE UINDERBUT GRAD PCH

## (undated) DEVICE — DRAPE LAVH SURG 109X109X100IN

## (undated) DEVICE — GLOVE SENSICARE PI GRN 7

## (undated) DEVICE — TUBING MEDI-VAC 20FT .25IN

## (undated) DEVICE — ELECTRODE BLADE W/SLEEVE 2.75

## (undated) DEVICE — COVER PROXIMA MAYO STAND

## (undated) DEVICE — SEALER LIGASURE LAP 37CM 5MM

## (undated) DEVICE — MANIPULATOR UTERINE

## (undated) DEVICE — APPLICATOR CHLORAPREP ORN 26ML

## (undated) DEVICE — SUPPORT ULNA NERVE PROTECTOR

## (undated) DEVICE — TOWEL OR DISP STRL BLUE 4/PK

## (undated) DEVICE — TIP YANKAUERS BULB NO VENT

## (undated) DEVICE — KIT ANTIFOG

## (undated) DEVICE — TROCAR ENDOPATH XCEL 8MM 10CM

## (undated) DEVICE — ELECTRODE REM PLYHSV RETURN 9

## (undated) DEVICE — SET PNEUMOCLEAR HEAT HUM SE HF

## (undated) DEVICE — DRAPE THREE-QUARTER 53X77IN

## (undated) DEVICE — SUT VICRYL UNDYED BRAID 0 54IN

## (undated) DEVICE — GLOVE SURG BIOGEL LATEX SZ 7.5

## (undated) DEVICE — SUCTION COAGULATOR 10FR 6IN

## (undated) DEVICE — KIT SUCTION CATH 10FR

## (undated) DEVICE — TROCAR ENDOPATH XCEL 5X100MM

## (undated) DEVICE — TRAY SKIN SCRUB WET 4 COMPART

## (undated) DEVICE — GLOVE SIGNATURE ESSNTL LTX 7